# Patient Record
Sex: MALE | Race: ASIAN | NOT HISPANIC OR LATINO
[De-identification: names, ages, dates, MRNs, and addresses within clinical notes are randomized per-mention and may not be internally consistent; named-entity substitution may affect disease eponyms.]

---

## 2018-02-23 ENCOUNTER — APPOINTMENT (OUTPATIENT)
Dept: INTERNAL MEDICINE | Facility: CLINIC | Age: 38
End: 2018-02-23
Payer: COMMERCIAL

## 2018-02-23 VITALS
OXYGEN SATURATION: 99 % | BODY MASS INDEX: 23.11 KG/M2 | TEMPERATURE: 98.7 F | HEART RATE: 60 BPM | SYSTOLIC BLOOD PRESSURE: 104 MMHG | WEIGHT: 156 LBS | RESPIRATION RATE: 14 BRPM | DIASTOLIC BLOOD PRESSURE: 70 MMHG | HEIGHT: 69 IN

## 2018-02-23 LAB
BASOPHILS # BLD AUTO: 0.01 K/UL
BASOPHILS NFR BLD AUTO: 0.2 %
EOSINOPHIL # BLD AUTO: 0.03 K/UL
EOSINOPHIL NFR BLD AUTO: 0.5 %
HCT VFR BLD CALC: 42.4 %
HGB BLD-MCNC: 14.6 G/DL
IMM GRANULOCYTES NFR BLD AUTO: 0.2 %
LYMPHOCYTES # BLD AUTO: 2.3 K/UL
LYMPHOCYTES NFR BLD AUTO: 41.4 %
MAN DIFF?: NORMAL
MCHC RBC-ENTMCNC: 30 PG
MCHC RBC-ENTMCNC: 34.4 GM/DL
MCV RBC AUTO: 87.1 FL
MONOCYTES # BLD AUTO: 0.31 K/UL
MONOCYTES NFR BLD AUTO: 5.6 %
NEUTROPHILS # BLD AUTO: 2.9 K/UL
NEUTROPHILS NFR BLD AUTO: 52.1 %
PLATELET # BLD AUTO: 222 K/UL
RBC # BLD: 4.87 M/UL
RBC # FLD: 13.2 %
WBC # FLD AUTO: 5.56 K/UL

## 2018-02-23 PROCEDURE — 36415 COLL VENOUS BLD VENIPUNCTURE: CPT

## 2018-02-23 PROCEDURE — 99385 PREV VISIT NEW AGE 18-39: CPT | Mod: 25

## 2018-02-24 DIAGNOSIS — E55.9 VITAMIN D DEFICIENCY, UNSPECIFIED: ICD-10-CM

## 2018-02-24 LAB
25(OH)D3 SERPL-MCNC: 22.7 NG/ML
ALBUMIN SERPL ELPH-MCNC: 4.3 G/DL
ALP BLD-CCNC: 81 U/L
ALT SERPL-CCNC: 14 U/L
ANION GAP SERPL CALC-SCNC: 10 MMOL/L
AST SERPL-CCNC: 23 U/L
BILIRUB SERPL-MCNC: 0.4 MG/DL
BUN SERPL-MCNC: 15 MG/DL
CALCIUM SERPL-MCNC: 9.3 MG/DL
CHLORIDE SERPL-SCNC: 99 MMOL/L
CHOLEST SERPL-MCNC: 172 MG/DL
CHOLEST/HDLC SERPL: 3.2 RATIO
CK SERPL-CCNC: 129 U/L
CO2 SERPL-SCNC: 28 MMOL/L
CREAT SERPL-MCNC: 1.21 MG/DL
GLUCOSE SERPL-MCNC: 93 MG/DL
HBA1C MFR BLD HPLC: 5.4 %
HDLC SERPL-MCNC: 53 MG/DL
LDLC SERPL CALC-MCNC: 103 MG/DL
POTASSIUM SERPL-SCNC: 4.3 MMOL/L
PROT SERPL-MCNC: 7.7 G/DL
SODIUM SERPL-SCNC: 137 MMOL/L
TRIGL SERPL-MCNC: 80 MG/DL
TSH SERPL-ACNC: 1.17 UIU/ML

## 2018-02-24 RX ORDER — MULTIVIT-MIN/FOLIC/VIT K/LYCOP 400-300MCG
50 MCG TABLET ORAL
Qty: 100 | Refills: 2 | Status: ACTIVE | COMMUNITY

## 2018-11-26 ENCOUNTER — APPOINTMENT (OUTPATIENT)
Dept: UROLOGY | Facility: CLINIC | Age: 38
End: 2018-11-26
Payer: COMMERCIAL

## 2018-11-26 VITALS
WEIGHT: 155 LBS | TEMPERATURE: 98 F | BODY MASS INDEX: 24.33 KG/M2 | HEART RATE: 55 BPM | SYSTOLIC BLOOD PRESSURE: 123 MMHG | DIASTOLIC BLOOD PRESSURE: 74 MMHG | OXYGEN SATURATION: 97 % | HEIGHT: 67 IN

## 2018-11-26 DIAGNOSIS — R31.0 GROSS HEMATURIA: ICD-10-CM

## 2018-11-26 DIAGNOSIS — F52.4 PREMATURE EJACULATION: ICD-10-CM

## 2018-11-26 PROCEDURE — 99204 OFFICE O/P NEW MOD 45 MIN: CPT

## 2018-11-27 LAB
APPEARANCE: CLEAR
BACTERIA: NEGATIVE
BILIRUBIN URINE: NEGATIVE
BLOOD URINE: NEGATIVE
COLOR: ABNORMAL
CORE LAB FLUID CYTOLOGY: NORMAL
GLUCOSE QUALITATIVE U: NEGATIVE MG/DL
KETONES URINE: NEGATIVE
LEUKOCYTE ESTERASE URINE: NEGATIVE
MICROSCOPIC-UA: NORMAL
NITRITE URINE: NEGATIVE
PH URINE: 7
PROTEIN URINE: NEGATIVE MG/DL
RED BLOOD CELLS URINE: 2 /HPF
SPECIFIC GRAVITY URINE: 1.03
SQUAMOUS EPITHELIAL CELLS: 0 /HPF
UROBILINOGEN URINE: NEGATIVE MG/DL
WHITE BLOOD CELLS URINE: 1 /HPF

## 2018-11-28 LAB — BACTERIA UR CULT: NORMAL

## 2018-12-06 DIAGNOSIS — N52.9 MALE ERECTILE DYSFUNCTION, UNSPECIFIED: ICD-10-CM

## 2018-12-12 ENCOUNTER — OUTPATIENT (OUTPATIENT)
Dept: OUTPATIENT SERVICES | Facility: HOSPITAL | Age: 38
LOS: 1 days | End: 2018-12-12
Payer: COMMERCIAL

## 2018-12-12 ENCOUNTER — APPOINTMENT (OUTPATIENT)
Dept: CT IMAGING | Facility: CLINIC | Age: 38
End: 2018-12-12
Payer: COMMERCIAL

## 2018-12-12 DIAGNOSIS — R31.0 GROSS HEMATURIA: ICD-10-CM

## 2018-12-12 PROCEDURE — 74178 CT ABD&PLV WO CNTR FLWD CNTR: CPT

## 2018-12-12 PROCEDURE — 74178 CT ABD&PLV WO CNTR FLWD CNTR: CPT | Mod: 26

## 2021-02-28 ENCOUNTER — INPATIENT (INPATIENT)
Facility: HOSPITAL | Age: 41
LOS: 2 days | Discharge: HOME HEALTH | End: 2021-03-03
Attending: INTERNAL MEDICINE | Admitting: INTERNAL MEDICINE
Payer: COMMERCIAL

## 2021-02-28 VITALS
OXYGEN SATURATION: 97 % | HEART RATE: 138 BPM | SYSTOLIC BLOOD PRESSURE: 117 MMHG | DIASTOLIC BLOOD PRESSURE: 76 MMHG | RESPIRATION RATE: 18 BRPM | TEMPERATURE: 99 F

## 2021-02-28 DIAGNOSIS — I26.99 OTHER PULMONARY EMBOLISM WITHOUT ACUTE COR PULMONALE: ICD-10-CM

## 2021-02-28 DIAGNOSIS — I26.94 MULTIPLE SUBSEGMENTAL PULMONARY EMBOLI WITHOUT ACUTE COR PULMONALE: ICD-10-CM

## 2021-02-28 DIAGNOSIS — U07.1 COVID-19: ICD-10-CM

## 2021-02-28 DIAGNOSIS — Z29.9 ENCOUNTER FOR PROPHYLACTIC MEASURES, UNSPECIFIED: ICD-10-CM

## 2021-02-28 LAB
ALBUMIN SERPL ELPH-MCNC: 4.3 G/DL — SIGNIFICANT CHANGE UP (ref 3.3–5)
ALP SERPL-CCNC: 105 U/L — SIGNIFICANT CHANGE UP (ref 40–120)
ALT FLD-CCNC: 25 U/L — SIGNIFICANT CHANGE UP (ref 4–41)
ANION GAP SERPL CALC-SCNC: 10 MMOL/L — SIGNIFICANT CHANGE UP (ref 7–14)
APPEARANCE UR: CLEAR — SIGNIFICANT CHANGE UP
APTT BLD: 30.6 SEC — SIGNIFICANT CHANGE UP (ref 27–36.3)
AST SERPL-CCNC: 15 U/L — SIGNIFICANT CHANGE UP (ref 4–40)
BACTERIA # UR AUTO: NEGATIVE — SIGNIFICANT CHANGE UP
BASOPHILS # BLD AUTO: 0.01 K/UL — SIGNIFICANT CHANGE UP (ref 0–0.2)
BASOPHILS NFR BLD AUTO: 0.1 % — SIGNIFICANT CHANGE UP (ref 0–2)
BILIRUB SERPL-MCNC: 0.8 MG/DL — SIGNIFICANT CHANGE UP (ref 0.2–1.2)
BILIRUB UR-MCNC: NEGATIVE — SIGNIFICANT CHANGE UP
BUN SERPL-MCNC: 14 MG/DL — SIGNIFICANT CHANGE UP (ref 7–23)
CALCIUM SERPL-MCNC: 9.3 MG/DL — SIGNIFICANT CHANGE UP (ref 8.4–10.5)
CHLORIDE SERPL-SCNC: 98 MMOL/L — SIGNIFICANT CHANGE UP (ref 98–107)
CO2 SERPL-SCNC: 27 MMOL/L — SIGNIFICANT CHANGE UP (ref 22–31)
COLOR SPEC: YELLOW — SIGNIFICANT CHANGE UP
CREAT SERPL-MCNC: 1.02 MG/DL — SIGNIFICANT CHANGE UP (ref 0.5–1.3)
D DIMER BLD IA.RAPID-MCNC: 633 NG/ML DDU — HIGH
DIFF PNL FLD: NEGATIVE — SIGNIFICANT CHANGE UP
EOSINOPHIL # BLD AUTO: 0.01 K/UL — SIGNIFICANT CHANGE UP (ref 0–0.5)
EOSINOPHIL NFR BLD AUTO: 0.1 % — SIGNIFICANT CHANGE UP (ref 0–6)
EPI CELLS # UR: 0 /HPF — SIGNIFICANT CHANGE UP (ref 0–5)
GLUCOSE SERPL-MCNC: 129 MG/DL — HIGH (ref 70–99)
GLUCOSE UR QL: NEGATIVE — SIGNIFICANT CHANGE UP
HCT VFR BLD CALC: 42.7 % — SIGNIFICANT CHANGE UP (ref 39–50)
HGB BLD-MCNC: 14.4 G/DL — SIGNIFICANT CHANGE UP (ref 13–17)
IANC: 7.88 K/UL — SIGNIFICANT CHANGE UP (ref 1.5–8.5)
IMM GRANULOCYTES NFR BLD AUTO: 0.9 % — SIGNIFICANT CHANGE UP (ref 0–1.5)
INR BLD: 1.28 RATIO — HIGH (ref 0.88–1.16)
KETONES UR-MCNC: NEGATIVE — SIGNIFICANT CHANGE UP
LEUKOCYTE ESTERASE UR-ACNC: NEGATIVE — SIGNIFICANT CHANGE UP
LYMPHOCYTES # BLD AUTO: 1.87 K/UL — SIGNIFICANT CHANGE UP (ref 1–3.3)
LYMPHOCYTES # BLD AUTO: 17.2 % — SIGNIFICANT CHANGE UP (ref 13–44)
MCHC RBC-ENTMCNC: 29.3 PG — SIGNIFICANT CHANGE UP (ref 27–34)
MCHC RBC-ENTMCNC: 33.7 GM/DL — SIGNIFICANT CHANGE UP (ref 32–36)
MCV RBC AUTO: 87 FL — SIGNIFICANT CHANGE UP (ref 80–100)
MONOCYTES # BLD AUTO: 1.01 K/UL — HIGH (ref 0–0.9)
MONOCYTES NFR BLD AUTO: 9.3 % — SIGNIFICANT CHANGE UP (ref 2–14)
NEUTROPHILS # BLD AUTO: 7.88 K/UL — HIGH (ref 1.8–7.4)
NEUTROPHILS NFR BLD AUTO: 72.4 % — SIGNIFICANT CHANGE UP (ref 43–77)
NITRITE UR-MCNC: NEGATIVE — SIGNIFICANT CHANGE UP
NRBC # BLD: 0 /100 WBCS — SIGNIFICANT CHANGE UP
NRBC # FLD: 0 K/UL — SIGNIFICANT CHANGE UP
PH UR: 6.5 — SIGNIFICANT CHANGE UP (ref 5–8)
PLATELET # BLD AUTO: 382 K/UL — SIGNIFICANT CHANGE UP (ref 150–400)
POTASSIUM SERPL-MCNC: 4 MMOL/L — SIGNIFICANT CHANGE UP (ref 3.5–5.3)
POTASSIUM SERPL-SCNC: 4 MMOL/L — SIGNIFICANT CHANGE UP (ref 3.5–5.3)
PROT SERPL-MCNC: 8.4 G/DL — HIGH (ref 6–8.3)
PROT UR-MCNC: ABNORMAL
PROTHROM AB SERPL-ACNC: 14.4 SEC — HIGH (ref 10.6–13.6)
RBC # BLD: 4.91 M/UL — SIGNIFICANT CHANGE UP (ref 4.2–5.8)
RBC # FLD: 12 % — SIGNIFICANT CHANGE UP (ref 10.3–14.5)
RBC CASTS # UR COMP ASSIST: 1 /HPF — SIGNIFICANT CHANGE UP (ref 0–4)
SARS-COV-2 RNA SPEC QL NAA+PROBE: DETECTED
SODIUM SERPL-SCNC: 135 MMOL/L — SIGNIFICANT CHANGE UP (ref 135–145)
SP GR SPEC: 1.03 — HIGH (ref 1.01–1.02)
UROBILINOGEN FLD QL: ABNORMAL
WBC # BLD: 10.88 K/UL — HIGH (ref 3.8–10.5)
WBC # FLD AUTO: 10.88 K/UL — HIGH (ref 3.8–10.5)
WBC UR QL: 1 /HPF — SIGNIFICANT CHANGE UP (ref 0–5)

## 2021-02-28 PROCEDURE — 99285 EMERGENCY DEPT VISIT HI MDM: CPT | Mod: 25

## 2021-02-28 PROCEDURE — 71045 X-RAY EXAM CHEST 1 VIEW: CPT | Mod: 26

## 2021-02-28 PROCEDURE — 71275 CT ANGIOGRAPHY CHEST: CPT | Mod: 26

## 2021-02-28 PROCEDURE — 93010 ELECTROCARDIOGRAM REPORT: CPT | Mod: 59

## 2021-02-28 PROCEDURE — 99223 1ST HOSP IP/OBS HIGH 75: CPT | Mod: GC

## 2021-02-28 RX ORDER — KETOROLAC TROMETHAMINE 30 MG/ML
15 SYRINGE (ML) INJECTION ONCE
Refills: 0 | Status: DISCONTINUED | OUTPATIENT
Start: 2021-02-28 | End: 2021-02-28

## 2021-02-28 RX ORDER — ACETAMINOPHEN 500 MG
650 TABLET ORAL ONCE
Refills: 0 | Status: COMPLETED | OUTPATIENT
Start: 2021-02-28 | End: 2021-02-28

## 2021-02-28 RX ORDER — ENOXAPARIN SODIUM 100 MG/ML
73 INJECTION SUBCUTANEOUS
Refills: 0 | Status: DISCONTINUED | OUTPATIENT
Start: 2021-02-28 | End: 2021-02-28

## 2021-02-28 RX ORDER — INFLUENZA VIRUS VACCINE 15; 15; 15; 15 UG/.5ML; UG/.5ML; UG/.5ML; UG/.5ML
0.5 SUSPENSION INTRAMUSCULAR ONCE
Refills: 0 | Status: DISCONTINUED | OUTPATIENT
Start: 2021-02-28 | End: 2021-03-03

## 2021-02-28 RX ORDER — SODIUM CHLORIDE 9 MG/ML
1000 INJECTION INTRAMUSCULAR; INTRAVENOUS; SUBCUTANEOUS ONCE
Refills: 0 | Status: COMPLETED | OUTPATIENT
Start: 2021-02-28 | End: 2021-02-28

## 2021-02-28 RX ORDER — LIDOCAINE 4 G/100G
1 CREAM TOPICAL ONCE
Refills: 0 | Status: COMPLETED | OUTPATIENT
Start: 2021-02-28 | End: 2021-02-28

## 2021-02-28 RX ORDER — ENOXAPARIN SODIUM 100 MG/ML
70 INJECTION SUBCUTANEOUS
Refills: 0 | Status: DISCONTINUED | OUTPATIENT
Start: 2021-02-28 | End: 2021-03-02

## 2021-02-28 RX ADMIN — ENOXAPARIN SODIUM 70 MILLIGRAM(S): 100 INJECTION SUBCUTANEOUS at 18:51

## 2021-02-28 RX ADMIN — Medication 15 MILLIGRAM(S): at 15:51

## 2021-02-28 RX ADMIN — SODIUM CHLORIDE 1000 MILLILITER(S): 9 INJECTION INTRAMUSCULAR; INTRAVENOUS; SUBCUTANEOUS at 15:52

## 2021-02-28 RX ADMIN — LIDOCAINE 1 PATCH: 4 CREAM TOPICAL at 15:51

## 2021-02-28 RX ADMIN — Medication 650 MILLIGRAM(S): at 15:51

## 2021-02-28 RX ADMIN — LIDOCAINE 1 PATCH: 4 CREAM TOPICAL at 19:18

## 2021-02-28 NOTE — H&P ADULT - PROBLEM SELECTOR PLAN 2
Prior COVID-19 infection still PCR+  -No role for remdesivir or steroids  -Lovenox as above Provoked pulmonary embolism with CT equivocal for Rt heart strain  -Concern for Rt heart strain clinically low given troponin 6 , ProBNP 251 and no exertional dyspnea  -F/u TTE  -Lovenox 1 mg/kg BID  -Transition to Eliquis or Xarelto depending on patient's insurance

## 2021-02-28 NOTE — PROVIDER CONTACT NOTE (OTHER) - SITUATION
Patient going to [id number], admitted to medicine. Confirmed by provider that patient will not need tele.

## 2021-02-28 NOTE — H&P ADULT - ASSESSMENT
40 M with recent COVID-19 infection now presenting with submassive PE without troponinemia and equivocal CT findings of R heart strain. 40 M with recent COVID-19 infection now presenting with back pain found to have PE without troponinemia and equivocal CT findings of R heart strain. 40 M with recent COVID-19 infection now presenting with back pain a/w SIRS due to PE without troponinemia and equivocal CT findings of Rt heart strain.

## 2021-02-28 NOTE — ED PROVIDER NOTE - OBJECTIVE STATEMENT
41 yo M with no significant PMHx except COVID19+ on 2/11/21, presents for 3 days of R back pain. It is constant, worse w/ movement, and pleuritic in nature. Pt also having decreased appetite and PO intake due to loss of taste/smell. pt also has been coughing, which worsened the pain. Denies fever, sob, abd pain, cp, nausea, vomiting, diarrhea, myalgias 39 yo M with no significant PMHx except COVID19+ on 2/11/21, presents for 3 days of R back pain. It is constant, worse w/ movement, and pleuritic in nature. Pt also having decreased appetite and PO intake due to loss of taste/smell. pt also has been coughing, which worsened the pain. Denies fever, sob, abd pain, cp, nausea, vomiting, diarrhea, myalgias    Has not seen PMD in about 2 years

## 2021-02-28 NOTE — H&P ADULT - PROBLEM SELECTOR PLAN 3
Lovenox 1 mg/kg as above  Regular diet  Normal activity Due to PE; Likely pleuritic etiology;   -Pain control

## 2021-02-28 NOTE — H&P ADULT - NSHPLABSRESULTS_GEN_ALL_CORE
LABS:                          14.4   10.88 )-----------( 382      ( 2021 15:40 )             42.7           135  |  98  |  14  ----------------------------<  129<H>  4.0   |  27  |  1.02    Ca    9.3      2021 15:40    TPro  8.4<H>  /  Alb  4.3  /  TBili  0.8  /  DBili  x   /  AST  15  /  ALT  25  /  AlkPhos  105         LIVER FUNCTIONS - ( 2021 15:40 )  Alb: 4.3 g/dL / Pro: 8.4 g/dL / ALK PHOS: 105 U/L / ALT: 25 U/L / AST: 15 U/L / GGT: x                    Urinalysis Basic - ( 2021 17:37 )    Color: Yellow / Appearance: Clear / S.033 / pH: x  Gluc: x / Ketone: Negative  / Bili: Negative / Urobili: 3 mg/dL   Blood: x / Protein: 30 mg/dL / Nitrite: Negative   Leuk Esterase: Negative / RBC: 1 /HPF / WBC 1 /HPF   Sq Epi: x / Non Sq Epi: 0 /HPF / Bacteria: Negative        PT/INR - ( 2021 15:40 )   PT: 14.4 sec;   INR: 1.28 ratio         PTT - ( 2021 15:40 )  PTT:30.6 sec    Lactate Trend            CAPILLARY BLOOD GLUCOSE            EKG-   no ST or TWI      RADIOLOGY & ADDITIONAL TESTS: Reviewed  CTA - Right-sided pulmonary emboli, question right heart strain. Echo recommended for complete evaluation. Bilateral subtle patchy lung opacities may be infectious or inflammatory. EKG, , sinus tach 123bpm,  no acute ST or TWI changes - my reading  CXR: very fine scattered opacifications more at b/l bases c/w h/o Covid-19, no pleural effusions - my reading                           14.4   10.88 )-----------( 382      ( 2021 15:40 )             42.7           135  |  98  |  14  ----------------------------<  129<H>  4.0   |  27  |  1.02    Ca    9.3      2021 15:40    TPro  8.4<H>  /  Alb  4.3  /  TBili  0.8  /  DBili  x   /  AST  15  /  ALT  25  /  AlkPhos  105         LIVER FUNCTIONS - ( 2021 15:40 )  Alb: 4.3 g/dL / Pro: 8.4 g/dL / ALK PHOS: 105 U/L / ALT: 25 U/L / AST: 15 U/L / GGT: x                Urinalysis Basic - ( 2021 17:37 )    Color: Yellow / Appearance: Clear / S.033 / pH: x  Gluc: x / Ketone: Negative  / Bili: Negative / Urobili: 3 mg/dL   Blood: x / Protein: 30 mg/dL / Nitrite: Negative   Leuk Esterase: Negative / RBC: 1 /HPF / WBC 1 /HPF   Sq Epi: x / Non Sq Epi: 0 /HPF / Bacteria: Negative    PT/INR - ( 2021 15:40 )   PT: 14.4 sec;   INR: 1.28 ratio         PTT - ( 2021 15:40 )  PTT:30.6 sec    Lactate Trend        RADIOLOGY & ADDITIONAL TESTS: Reviewed  CTA - Right-sided pulmonary emboli, question right heart strain. Echo recommended for complete evaluation. Bilateral subtle patchy lung opacities may be infectious or inflammatory.

## 2021-02-28 NOTE — ED PROVIDER NOTE - CLINICAL SUMMARY MEDICAL DECISION MAKING FREE TEXT BOX
Oswaldo Romero MD. pt with covid19+ on 2/11/21, presents for 3 days of posterior rib/back pain, pleuritic in nature. has been coughing but denies sob. denies radiation of pain. karen hematuria. comforable appearing. tachy. could be msk vs ?stone (will check for hematuria) cannot perc out and thus will dimer, labs, and if dimer positive, pursue CTA.

## 2021-02-28 NOTE — H&P ADULT - HISTORY OF PRESENT ILLNESS
Mr. Freitas is a 41 yo M with recent COVID-19 infection presenting with right back pain.    He was in his usual state of health until 2/11 when he tested positive for COVID-19. He had begun noticing dry cough intermittently productive of yellow/green sputum, fevers, chills, one episode of rigors, anosmia, loss of taste. He self-quarantined for 14 days over which time his fevers, chills, and cough resolved but his anosmia and loss of taste persisted. A repeat COVID-19 test was negative outpatient. Then, on Friday night he began noticing 7-8/10 right back pain that is worse with movements, breathing, and cough, which continued to progress to 10/10 pain on Saturday. He had no associated shortness of breath or cough nor dyspnea on exertion. When his right back pain persisted he presented to the emergency department for evaluation.    In the ED, T 99.1-99.8,  > 1 L NS > 93, -123/75-86. In addition to 1  L NS, he received ketorolac 15 mg IV which controlled his pain from 10/10 to 5/10. 39 yo Male with recent COVID-19 infection presenting with right back pain. He was in his usual state of health until 2/11 when he tested positive for COVID-19. He had begun noticing dry cough intermittently productive of yellow/green sputum, fevers, chills, one episode of rigors, anosmia, loss of taste. He self-quarantined for 14 days over which time his fevers, chills, and cough resolved but his anosmia and loss of taste persisted. A repeat COVID-19 test was negative outpatient. Then, on Friday night he began noticing 7-8/10 right back pain that is worse with movements, breathing, and cough, which continued to progress to 10/10 pain on Saturday. He had no associated shortness of breath or cough nor dyspnea on exertion. When his right back pain persisted he presented to the emergency department for evaluation.    ED course:  T 99.1-99.8,  > 1 L NS > 93, -123/75-86. In addition to 1  L NS, he received ketorolac 15 mg IV which controlled his pain from 10/10 to 5/10.

## 2021-02-28 NOTE — ED PROVIDER NOTE - ATTENDING CONTRIBUTION TO CARE
39yo M otherwise healthy covid + x 1 week pw mid right sided back pain. pain worse with movement, cough and inspiration. pain is constant. has not taken anything for pain. no urinary sx, no nausea, vomiting, diarrhea, + decreased PO. no chest pain or sob  on exam + tender in right flank area. abd nontender  tachycardic, lungs clear  likely msk, however given pleuritic component, tachycardia and covid positive will check dimer  will alos check UA to ro pyelo butno urinary sx

## 2021-02-28 NOTE — ED PROVIDER NOTE - NS ED ROS FT
Constitutional: no fevers; no chills  HEENT: no visual changes, no sore throat, no rhinorrhea  CV: cp; no palpitations  Resp: no sob; cough  GI: no abd pain, no nausea, no vomiting, no diarrhea, no constipation  : no dysuria, no hematuria  MSK: no myalgais; no arthralgias  skin: no rashes  neuro: no HA, no numbness; no weakness, no tingling  ROS statement: all other ROS negative except as per HPI

## 2021-02-28 NOTE — ED ADULT NURSE NOTE - OBJECTIVE STATEMENT
Break covering RN: 41 y/o M received to room 21 c/o R sided flank pain. Pt a&ox4 and ambulatory. Pt states for 5 days, Break covering RN: 41 y/o M received to room 21 c/o R sided flank pain. Pt a&ox4 and ambulatory. Pt states for 5 days, hes had R sided flank pain. Pt states he was covid (+) 11 days ago and has since had a cough. Pt endorses fevers at home and sob. Pt respirations even and unlabored. pt abdomen soft nontender nondistended. NO CVA tenderness noted. Pt denies n/v/d, urinary frequency urinary urgency or hematuria. Vital signs as noted, call bell in reach, comfort measures provided, give report to primary RN.

## 2021-02-28 NOTE — H&P ADULT - PROBLEM SELECTOR PLAN 1
Provoked submassive pulmonary embolism with CT equivocal for R heart strain  -Concern for R heart strain clinically low given troponin 6 and no exertional dyspnea  -F/u TTE  -Lovenox 1 mg/kg BID  -Consider transition to eliquis or xarelto depending on patient's insurance Provoked pulmonary embolism with CT equivocal for R heart strain  -Concern for R heart strain clinically low given troponin 6 and no exertional dyspnea  -F/u TTE  -Lovenox 1 mg/kg BID  -Consider transition to eliquis or xarelto depending on patient's insurance Tachycardia, HR in 130-90s; RR=30s-20s; Due to acute post covid-19 PE; WBC=11K  -Hold Abx  -VS q4h  -Plan as below

## 2021-02-28 NOTE — ED PROVIDER NOTE - PROGRESS NOTE DETAILS
Oswaldo Romero MD. received call from rads regarding R PE with mild RH strain. pt not hypoxic. tachycardia improved. nto on supplemental O2. explained results to patient and need to stay for echo and start AC. spoke w/ hospitalist and given pt has good renal fxn, will opt for therapeutic lovenox.

## 2021-02-28 NOTE — H&P ADULT - NSHPSOCIALHISTORY_GEN_ALL_CORE
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

## 2021-02-28 NOTE — ED PROVIDER NOTE - PHYSICAL EXAMINATION
PHYSICAL EXAM:  GENERAL: non-toxic appearing; in no respiratory distress  HEAD Atraumatic, Normocephalic  NECK: No JVD; FROM  EYES: PERRL, EOMs intact b/l w/out deficits; normal conjunctiva  CHEST/LUNG: CTAB no wheezes/rhonchi/rales  HEART: RRR no murmur/gallops/rubs  ABDOMEN: +BS, soft, NT, ND  EXTREMITIES: No LE edema, +2 radial pulses b/l, +2 DP/PT pulses b/l  MUSCULOSKELETAL: FROM of all 4 extremities; point tender to Right posterior rib  NERVOUS SYSTEM:  A&Ox3, No motor deficits or sensory deficits; CNII-XII intact; no focal neurologic deficits  SKIN:  No new rashes PHYSICAL EXAM:  GENERAL: non-toxic appearing; in no respiratory distress  HEAD Atraumatic, Normocephalic  NECK: No JVD; FROM  EYES: PERRL, EOMs intact b/l w/out deficits; normal conjunctiva  CHEST/LUNG: CTAB no wheezes/rhonchi/rales  HEART: RRR no murmur/gallops/rubs  ABDOMEN: +BS, soft, NT, ND; no CVAT  EXTREMITIES: No LE edema, +2 radial pulses b/l, +2 DP/PT pulses b/l  MUSCULOSKELETAL: FROM of all 4 extremities; point tender to Right posterior rib  NERVOUS SYSTEM:  A&Ox3, No motor deficits or sensory deficits; CNII-XII intact; no focal neurologic deficits  SKIN:  No new rashes

## 2021-02-28 NOTE — H&P ADULT - NSHPREVIEWOFSYSTEMS_GEN_ALL_CORE
REVIEW OF SYSTEMS:    CONSTITUTIONAL: No weakness, fevers or chills  EYES/ENT: No visual changes;  No vertigo or throat pain   NECK: No pain or stiffness  RESPIRATORY: No cough, wheezing, hemoptysis; No shortness of breath. +Pleuritic chest pain  CARDIOVASCULAR: No chest pain or palpitations  GASTROINTESTINAL: No abdominal pain; nausea, vomiting;  diarrhea or constipation. No hematemesis, melena or hematochezia.  GENITOURINARY: No dysuria, frequency or hematuria  NEUROLOGICAL: No numbness or weakness  SKIN: No itching, burning, rashes, or lesions   All other review of systems is negative unless indicated above. REVIEW OF SYSTEMS:    CONSTITUTIONAL: No weakness, fevers or chills  EYES/ENT: No visual changes;  No vertigo or throat pain   NECK: No pain or stiffness  RESPIRATORY: No cough, wheezing, hemoptysis;  no shortness of breath. +Pleuritic chest pain  CARDIOVASCULAR: No chest pain or palpitations  GASTROINTESTINAL: No abdominal pain; nausea, vomiting;  diarrhea or constipation. No hematemesis, melena or hematochezia.  GENITOURINARY: No dysuria, frequency or hematuria  NEUROLOGICAL: No numbness or weakness  SKIN: No itching, burning, rashes, or lesions   All other review of systems is negative unless indicated above.

## 2021-02-28 NOTE — H&P ADULT - NSHPPHYSICALEXAM_GEN_ALL_CORE
PHYSICAL EXAM:    Vital Signs Last 24 Hrs  T(C): 37.3 (28 Feb 2021 17:47), Max: 37.7 (28 Feb 2021 15:34)  T(F): 99.1 (28 Feb 2021 17:47), Max: 99.8 (28 Feb 2021 15:34)  HR: 93 (28 Feb 2021 17:47) (93 - 138)  BP: 123/75 (28 Feb 2021 17:47) (117/76 - 124/85)  BP(mean): --  RR: 28 (28 Feb 2021 17:47) (18 - 32)  SpO2: 95% (28 Feb 2021 17:47) (95% - 98%)    General: No acute distress  HEENT: NCAT.  PERRL.  EOMI.  No scleral icterus or injection.  Moist MM.  No oropharyngeal exudates.    Neck: Supple.  Full ROM.  No JVD.  No thyromegaly. No lymphadenopathy.   Heart: RRR.  Normal S1 and S2, increase s2 split. No murmurs, rubs, or gallops.  Lungs: CTAB. No wheezes, crackles, or rhonchi.    Abdomen: BS+, soft, NT/ND.  No organomegaly.  Skin: Warm and dry.  No rashes.  Extremities: No edema, clubbing, or cyanosis.  2+ peripheral pulses b/l.  Musculoskeletal: No deformities.  No spinal or paraspinal tenderness.  Neuro: A&Ox3.  MAEx4.  Tactile sensation intact in UE and LE b/l.

## 2021-03-01 DIAGNOSIS — M54.6 PAIN IN THORACIC SPINE: ICD-10-CM

## 2021-03-01 DIAGNOSIS — R65.10 SYSTEMIC INFLAMMATORY RESPONSE SYNDROME (SIRS) OF NON-INFECTIOUS ORIGIN WITHOUT ACUTE ORGAN DYSFUNCTION: ICD-10-CM

## 2021-03-01 PROCEDURE — 99223 1ST HOSP IP/OBS HIGH 75: CPT

## 2021-03-01 PROCEDURE — 93970 EXTREMITY STUDY: CPT | Mod: 26

## 2021-03-01 RX ADMIN — ENOXAPARIN SODIUM 70 MILLIGRAM(S): 100 INJECTION SUBCUTANEOUS at 06:04

## 2021-03-01 RX ADMIN — ENOXAPARIN SODIUM 70 MILLIGRAM(S): 100 INJECTION SUBCUTANEOUS at 17:02

## 2021-03-01 NOTE — CONSULT NOTE ADULT - ATTENDING COMMENTS
Patient was seen and examined by me on 03/01/2021,interim events noted,labs and radiology studies reviewed.  James Diaz MD,[facility].  [address] [place] [address].  [id number]. [phone]

## 2021-03-01 NOTE — CONSULT NOTE ADULT - ASSESSMENT
· Assessment      40 M with recent COVID-19 infection now presenting with back pain a/w SIRS due to PE without troponinemia and equivocal CT findings of Rt heart strain.    Problem/Plan - 1:  ·  Problem: SIRS (systemic inflammatory response syndrome).  Plan: Tachycardia, HR in 130-90s; RR=30s-20s; Due to acute post covid-19 PE; WBC=11K  -Hold Abx  -VS q4h  -Plan as below.     Problem/Plan - 2:  ·  Problem: Pulmonary embolism.  Plan: Provoked pulmonary embolism with CT equivocal for Rt heart strain  -Concern for Rt heart strain clinically low given troponin 6 , ProBNP 251 and no exertional dyspnea  -F/u TTE  -Lovenox 1 mg/kg BID  -Transition to Eliquis    Problem/Plan - 3:  ·  Problem: Acute thoracic back pain, unspecified back pain laterality.  Plan: Due to PE; Likely pleuritic etiology;   -Pain control.     Problem/Plan - 4:  ·  Problem: COVID-19.  Plan: Prior COVID-19 infection still PCR+  -No role for remdesivir or steroids  -Lovenox as above.     Problem/Plan - 5:  ·  Problem: Preventive measure.  Plan: Lovenox 1 mg/kg as above  Regular diet  Normal activity.

## 2021-03-01 NOTE — CONSULT NOTE ADULT - SUBJECTIVE AND OBJECTIVE BOX
PULMONARY CONSULT NOTE      ISAMAR DELAROSA  MRN-[id number]    Patient is a 40y old  Male who presents with a chief complaint of Back pain (01 Mar 2021 13:11)      HISTORY OF PRESENT ILLNESS:  41 yo never smoker, recent covid 19 infectio mid feb- never admitted,reportedly swab negative on 2/26, (took OTC meds for COVID/supportive care) admitted with 2 days of right sided flank pain  Found to have PE.  denies history of similar events. no immobility. no history of DVT/PE in family. no autoimmune history. did not take NOAC or aspirin with covid  no LE swelling. lingering cough- sometimes feels difficulty catching his breath when talking  no hemoptysis. no unintentional weight loss. no night sweats    on room air - pain resolved      Allergies    No Known Allergies    Intolerances        PAST MEDICAL & SURGICAL HISTORY:  Pneumonia due to COVID-19 virus    No significant past surgical history            FAMILY HISTORY:  FH: HTN (hypertension)      Prescriptions:      SOCIAL HISTORY  Smoking History: denies    REVIEW OF SYSTEMS:    CONSTITUTIONAL:  No fevers, chills, sweats    HEENT:  Eyes:  No diplopia or blurred vision. ENT:  No earache, sore throat or runny nose.    CARDIOVASCULAR:  No pressure, squeezing, tightness, or heaviness about the chest; no palpitations.    RESPIRATORY:  Per HPI    GASTROINTESTINAL:  No abdominal pain, nausea, vomiting or diarrhea.    GENITOURINARY:  No dysuria, frequency or urgency.    NEUROLOGIC:  No paresthesias, fasciculations, seizures or weakness.    PSYCHIATRIC:  No disorder of thought or mood.    Vital Signs Last 24 Hrs  T(C): 37.2 (01 Mar 2021 06:03), Max: 37.7 (28 Feb 2021 15:34)  T(F): 98.9 (01 Mar 2021 06:03), Max: 99.8 (28 Feb 2021 15:34)  HR: 90 (01 Mar 2021 06:03) (85 - 116)  BP: 111/88 (01 Mar 2021 06:03) (111/88 - 124/85)  BP(mean): --  RR: 18 (01 Mar 2021 06:03) (18 - 32)  SpO2: 97% (01 Mar 2021 06:03) (94% - 98%)    PHYSICAL EXAMINATION:    GENERAL: The patient is a well-developed, well-nourished male in no apparent distress.     HEENT: Head is normocephalic and atraumatic.      LUNGS:  Respirations unlabored    HEART: Regular rate         NEUROLOGIC: Grossly intact      MEDICATIONS  (STANDING):  enoxaparin Injectable 70 milliGRAM(s) SubCutaneous two times a day  influenza   Vaccine 0.5 milliLiter(s) IntraMuscular once      MEDICATIONS  (PRN):        LABS:   CBC Full  -  ( 28 Feb 2021 15:40 )  WBC Count : 10.88 K/uL  RBC Count : 4.91 M/uL  Hemoglobin : 14.4 g/dL  Hematocrit : 42.7 %  Platelet Count - Automated : 382 K/uL  Mean Cell Volume : 87.0 fL  Mean Cell Hemoglobin : 29.3 pg  Mean Cell Hemoglobin Concentration : 33.7 gm/dL  Auto Neutrophil # : 7.88 K/uL  Auto Lymphocyte # : 1.87 K/uL  Auto Monocyte # : 1.01 K/uL  Auto Eosinophil # : 0.01 K/uL  Auto Basophil # : 0.01 K/uL  Auto Neutrophil % : 72.4 %  Auto Lymphocyte % : 17.2 %  Auto Monocyte % : 9.3 %  Auto Eosinophil % : 0.1 %  Auto Basophil % : 0.1 %    PT/INR - ( 28 Feb 2021 15:40 )   PT: 14.4 sec;   INR: 1.28 ratio         PTT - ( 28 Feb 2021 15:40 )  PTT:30.6 sec  02-28    135  |  98  |  14  ----------------------------<  129<H>  4.0   |  27  |  1.02    Ca    9.3      28 Feb 2021 15:40    TPro  8.4<H>  /  Alb  4.3  /  TBili  0.8  /  DBili  x   /  AST  15  /  ALT  25  /  AlkPhos  105  02-28       RADIOLOGY & ADDITIONAL STUDIES:  ra< from: CT Angio Chest w/ IV Cont (02.28.21 @ 17:06) >    EXAM:  CT ANGIO CHEST (W)AW IC        PROCEDURE DATE:  Feb 28 2021         INTERPRETATION:  EXAMINATION: CT ANGIO CHEST WITHOUT AND OR WITH IV CONTRAST    CLINICAL INDICATION: Dyspnea    TECHNIQUE: CTA of the chest was performed for evaluation of pulmonary embolism after administration of 90ml of Omnipaque-350, 10ml discarded.  MIP images were reconstructed.    COMPARISON: None.    FINDINGS:    PULMONARY ARTERIES: Right upper and lower lobe segmental and subsegmental pulmonary emboli.    AIRWAYS AND LUNGS: The central tracheobronchial tree is patent.  Subtle patchy bilateral lung opacities. Bubbly consolidation right lower lobe likely represents pulmonary infarct.    MEDIASTINUM AND PLEURA: There are no enlarged mediastinal, hilar or axillary lymph nodes. The visualized portion of the thyroid gland is unremarkable. There is a small right pleural effusion. There is no pneumothorax.    HEART AND VESSELS: The heart is normal in size. Slightly enlarged right heart.  There are no atherosclerotic calcifications of the aorta.  There is no pericardial effusion.    UPPER ABDOMEN: Images of the upper abdomen demonstrate no abnormality.    BONES AND SOFT TISSUES: The bones are unremarkable.  The soft tissues are unremarkable.    TUBES/LINES: None.    IMPRESSION:  Right-sided pulmonary emboli, question right heart strain. Echo recommended for complete evaluation.    Bilateral subtle patchy lung opacities may be infectious or inflammatory.    These findings were discussed with Dr. SOLANO  at 2/28/2021 5:50 PM by Dr. Sonia lennon back confirmation.              BRICE ADAMS MD; Attending Radiologist  This document has been electronically signed. Feb 28 2021  6:01PM    < end of copied text >  < from: Xray Chest 1 View AP/PA (02.28.21 @ 15:44) >    EXAM:  XR CHEST AP OR PA 1V        PROCEDURE DATE:  Feb 28 2021         INTERPRETATION:  INDICATION: Cough. Right pleuritic back pain.    TECHNIQUE: Single AP view of the chest.    COMPARISON: None.    FINDINGS: The cardiac silhouette is normal in size. The lung volumes are slightly low. There are no focal consolidations or pleural effusions. The hilar and mediastinal structures appear unremarkable.    IMPRESSION: Slightly low lung volumes with clear lungs.              COY HERNANDEZ MD; Attending Radiologist  This document has been electronically signed. Feb 28 2021  3:52PM    < end of copied text >    ECHO:  none    ASSESSMENT:  41 yo with COVID in feb now with PE    PLAN:  agree with TTE  check US dopplers  likely PE due to COVID 19  consider ct a/p to r/o malignancy  continue lovenox for now  if RV strain on TTE- Interventional cardio (although less likely given normal probnp & troponin)  heme consult       Thank you for allowing me to participate in the care of this patient.  Please feel free to call me for any questions/concerns.      Georgia Jonas, DO  [facility]  [phone]

## 2021-03-01 NOTE — PROGRESS NOTE ADULT - SUBJECTIVE AND OBJECTIVE BOX
SUBJECTIVE / OVERNIGHT EVENTS: pt denies chest pain, shortness of breath       MEDICATIONS  (STANDING):  enoxaparin Injectable 70 milliGRAM(s) SubCutaneous two times a day  influenza   Vaccine 0.5 milliLiter(s) IntraMuscular once    MEDICATIONS  (PRN):    Vital Signs Last 24 Hrs  T(C): 36.8 (01 Mar 2021 22:02), Max: 37.2 (01 Mar 2021 06:03)  T(F): 98.2 (01 Mar 2021 22:02), Max: 98.9 (01 Mar 2021 06:03)  HR: 88 (01 Mar 2021 22:02) (85 - 92)  BP: 122/68 (01 Mar 2021 22:02) (111/88 - 122/72)  BP(mean): --  RR: 18 (01 Mar 2021 22:02) (18 - 18)  SpO2: 99% (01 Mar 2021 22:02) (96% - 99%)    CAPILLARY BLOOD GLUCOSE        I&O's Summary      Constitutional: No fever, fatigue  Skin: No rash.  Eyes: No recent vision problems or eye pain.  ENT: No congestion, ear pain, or sore throat.  Cardiovascular: No chest pain or palpation.  Respiratory: No cough, shortness of breath, congestion, or wheezing.  Gastrointestinal: No abdominal pain, nausea, vomiting, or diarrhea.  Genitourinary: No dysuria.  Musculoskeletal: No joint swelling.  Neurologic: No headache.    PHYSICAL EXAM:  GENERAL: NAD  EYES: EOMI, PERRLA  NECK: Supple, No JVD  CHEST/LUNG: dec breath sounds rt base  HEART:  S1 , S2 +  ABDOMEN: soft , bs+  EXTREMITIES:  no edema  NEUROLOGY:alert awake oriented       LABS:                        14.4   10.88 )-----------( 382      ( 2021 15:40 )             42.7         135  |  98  |  14  ----------------------------<  129<H>  4.0   |  27  |  1.02    Ca    9.3      2021 15:40    TPro  8.4<H>  /  Alb  4.3  /  TBili  0.8  /  DBili  x   /  AST  15  /  ALT  25  /  AlkPhos  105      PT/INR - ( 2021 15:40 )   PT: 14.4 sec;   INR: 1.28 ratio         PTT - ( 2021 15:40 )  PTT:30.6 sec      Urinalysis Basic - ( 2021 17:37 )    Color: Yellow / Appearance: Clear / S.033 / pH: x  Gluc: x / Ketone: Negative  / Bili: Negative / Urobili: 3 mg/dL   Blood: x / Protein: 30 mg/dL / Nitrite: Negative   Leuk Esterase: Negative / RBC: 1 /HPF / WBC 1 /HPF   Sq Epi: x / Non Sq Epi: 0 /HPF / Bacteria: Negative        RADIOLOGY & ADDITIONAL TESTS:    Imaging Personally Reviewed:    Consultant(s) Notes Reviewed:      Care Discussed with Consultants/Other Providers:

## 2021-03-01 NOTE — CONSULT NOTE ADULT - SUBJECTIVE AND OBJECTIVE BOX
DATE OF SERVICE: 03/01/2021   Patient was seen,examined and evaluated  by me.ER evaluation, Labs and Hospital course was reviewed,    CHIEF COMPLAINT:Back Pain    HPI:39 yo Male with recent COVID-19 infection presenting with right back pain.   He was in his usual state of health until 2/11 when he tested positive for COVID-19.   He had begun noticing dry cough intermittently productive of yellow/green sputum, fevers, chills, one episode of rigors, anosmia, loss of taste.   He self-quarantined for 14 days over which time his fevers, chills, and cough resolved but his anosmia and loss of taste persisted.   A repeat COVID-19 test was negative outpatient.   Then, on Friday night he began noticing 7-8/10 right back pain that is worse with movements, breathing, and cough, which continued to progress to 10/10 pain on Saturday. He had no associated shortness of breath or cough nor dyspnea on exertion. When his right back pain persisted he presented to the emergency department for evaluation.    ED course:  T 99.1-99.8,  > 1 L NS > 93, -123/75-86. In addition to 1  L NS, he received ketorolac 15 mg IV which controlled his pain from 10/10 to 5/10. (28 Feb 2021 20:00)      PAST MEDICAL & SURGICAL HISTORY:  Pneumonia due to COVID-19 virus  No significant past surgical history        MEDICATIONS  (STANDING):  enoxaparin Injectable 70 milliGRAM(s) SubCutaneous two times a day  influenza   Vaccine 0.5 milliLiter(s) IntraMuscular once        FAMILY HISTORY:  FH: HTN (hypertension)      No family history of premature coronary artery disease or sudden cardiac death    SOCIAL HISTORY:  Smoking-[ ] Active  [ ] Former [ ] Non Smoker  Alcohol-[ ] Denies [ ] Social [ ] Daily  Ilicit Drug use-[ ] Denies [ ] Active user    REVIEW OF SYSTEMS:  Constitutional: [ ] fever, [ ]weight loss, [ ]fatigue   Activity [ ] Bedbound,[ ] Ambulates [ ] Unassisted[ ] Cane/Walker [ ] Assistence.  Effort tolerance:[ ] Excellent [ ] Good [ ] Fair [ ] Poor [ ]  Eyes: [ ] visual changes  Respiratory: [ ]shortness of breath;  [ ] cough, [ ]wheezing, [ ]chills, [ ]hemoptysis  Cardiovascular: [ ] chest pain, [ ]palpitations, [ ]dizziness,  [ ]leg swelling[ ]orthopnea [ ]PND  Gastrointestinal: [ ] abdominal pain, [ ]nausea, [ ]vomiting,  [ ]diarrhea,[ ]constipation  Genitourinary: [ ] dysuria, [ ] hematuria  Neurologic: [ ] headaches [ ] tremors[ ] weakness  Skin: [ ] itching, [ ]burning, [ ] rashes  Endocrine: [ ] heat or cold intolerance  Musculoskeletal: [ ] joint pain or swelling; [ ] muscle, back, or extremity pain  Psychiatric: [ ] depression, [ ]anxiety, [ ]mood swings, or [ ]difficulty sleeping  Hematologic: [ ] easy bruising, [ ] bleeding gums       [ x] All others negative	  [ ] Unable to obtain    Vital Signs Last 24 Hrs  T(C): 37.2 (01 Mar 2021 06:03), Max: 37.7 (28 Feb 2021 15:34)  T(F): 98.9 (01 Mar 2021 06:03), Max: 99.8 (28 Feb 2021 15:34)  HR: 90 (01 Mar 2021 06:03) (85 - 116)  BP: 111/88 (01 Mar 2021 06:03) (111/88 - 124/85)  RR: 18 (01 Mar 2021 06:03) (18 - 32)  SpO2: 97% (01 Mar 2021 06:03) (94% - 98%)      PHYSICAL EXAM:  General: No acute distress BMI-  HEENT: EOMI, PERRL[ ] Icteric  Neck: Supple, No JVD  Lungs: Equal air entry bilaterally; [ ] Rales [ ] Rhonchi [ ] Wheezing  Heart: Regular rate and rhythm;[ ] Murmurs-   /6 [ ] Systolic [ ] Diastolic [ ] Radiation,No rubs, or gallops  Abdomen: Nontender, bowel sounds present  Extremities: No clubbing, cyanosis, or edema[ ] Calf tenderness  Nervous system:  Alert & Oriented X3, no focal deficits  Psychiatric: Normal affect  Skin: No rashes or lesions      LABS:  02-28    135  |  98  |  14  ----------------------------<  129<H>  4.0   |  27  |  1.02    Ca    9.3      28 Feb 2021 15:40    TPro  8.4<H>  /  Alb  4.3  /  TBili  0.8  /  DBili  x   /  AST  15  /  ALT  25  /  AlkPhos  105  02-28    Creatinine Trend: 1.02<--                        14.4   10.88 )-----------( 382      ( 28 Feb 2021 15:40 )             42.7     PT/INR - ( 28 Feb 2021 15:40 )   PT: 14.4 sec;   INR: 1.28 ratio     PTT - ( 28 Feb 2021 15:40 )  PTT:30.6 sec    Serum Pro-Brain Natriuretic Peptide: 251 pg/mL (02-28-21 @ 15:41)        RADIOLOGY: CT ANGIO CHEST (W)AW IC    IMPRESSION:  Right-sided pulmonary emboli, question right heart strain. Echo recommended for complete evaluation.  Bilateral subtle patchy lung opacities may be infectious or inflammatory.    ECG [my interpretation]:    TELEMETRY:    ECHO:    STRESS TEST:    CATHETERIZATION: DATE OF SERVICE: 03/01/2021   Patient was seen,examined and evaluated  by me.ER evaluation, Labs and Hospital course was reviewed,    CHIEF COMPLAINT:Back Pain    HPI:41 yo Male with recent COVID-19 infection presenting with right back pain.   He was in his usual state of health until 2/11 when he tested positive for COVID-19.   He had begun noticing dry cough intermittently productive of yellow/green sputum, fevers, chills, one episode of rigors, anosmia, loss of taste.   He self-quarantined for 14 days over which time his fevers, chills, and cough resolved but his anosmia and loss of taste persisted.   A repeat COVID-19 test was negative outpatient.   Then, on Friday night he began noticing 7-8/10 right back pain that is worse with movements, breathing, and cough, which continued to progress to 10/10 pain on Saturday. He had no associated shortness of breath or cough nor dyspnea on exertion. When his right back pain persisted he presented to the emergency department for evaluation.    ED course:  T 99.1-99.8,  > 1 L NS > 93, -123/75-86. In addition to 1  L NS, he received ketorolac 15 mg IV which controlled his pain from 10/10 to 5/10. (28 Feb 2021 20:00)      PAST MEDICAL & SURGICAL HISTORY:  Pneumonia due to COVID-19 virus  No significant past surgical history        MEDICATIONS  (STANDING):  enoxaparin Injectable 70 milliGRAM(s) SubCutaneous two times a day  influenza   Vaccine 0.5 milliLiter(s) IntraMuscular once        FAMILY HISTORY:  FH: HTN (hypertension)      No family history of premature coronary artery disease or sudden cardiac death    SOCIAL HISTORY:  Smoking-[ ] Active  [ ] Former [x ] Non Smoker  Alcohol-[x ] Denies [ ] Social [ ] Daily  Ilicit Drug use-[x ] Denies [ ] Active user    REVIEW OF SYSTEMS:  Constitutional: [ ] fever, [ ]weight loss, [x ]fatigue   Activity [ ] Bedbound,[ x] Ambulates [x ] Unassisted[ ] Cane/Walker [ ] Assistence.  Effort tolerance:[ ] Excellent [x ] Good [ ] Fair [ ] Poor [ ]  Eyes: [ ] visual changes  Respiratory: [ ]shortness of breath;  [ ] cough, [ ]wheezing, [ ]chills, [ ]hemoptysis  Cardiovascular: [ ] chest pain, [ ]palpitations, [ ]dizziness,  [ ]leg swelling[ ]orthopnea [ ]PND  Gastrointestinal: [ ] abdominal pain, [ ]nausea, [ ]vomiting,  [ ]diarrhea,[ ]constipation  Genitourinary: [ ] dysuria, [ ] hematuria  Neurologic: [ ] headaches [ ] tremors[ ] weakness  Skin: [ ] itching, [ ]burning, [ ] rashes  Endocrine: [ ] heat or cold intolerance  Musculoskeletal: [ ] joint pain or swelling; [x ] muscle, back, or extremity pain  Psychiatric: [ ] depression, [ ]anxiety, [ ]mood swings, or [ ]difficulty sleeping  Hematologic: [ ] easy bruising, [ ] bleeding gums       [ x] All others negative	  [ ] Unable to obtain    Vital Signs Last 24 Hrs  T(C): 37.2 (01 Mar 2021 06:03), Max: 37.7 (28 Feb 2021 15:34)  T(F): 98.9 (01 Mar 2021 06:03), Max: 99.8 (28 Feb 2021 15:34)  HR: 90 (01 Mar 2021 06:03) (85 - 116)  BP: 111/88 (01 Mar 2021 06:03) (111/88 - 124/85)  RR: 18 (01 Mar 2021 06:03) (18 - 32)  SpO2: 97% (01 Mar 2021 06:03) (94% - 98%)      PHYSICAL EXAM:  General: No acute distress BMI-26  HEENT: EOMI, PERRL[ ] Icteric  Neck: Supple, No JVD  Lungs: Equal air entry bilaterally; [ ] Rales [ ] Rhonchi [ ] Wheezing  Heart: Regular rate and rhythm;[x ] Murmurs-   2/6 [x ] Systolic [ ] Diastolic [ ] Radiation,No rubs, or gallops  Abdomen: Nontender, bowel sounds present  Extremities: No clubbing, cyanosis, or edema[ ] Calf tenderness  Nervous system:  Alert & Oriented X3, no focal deficits  Psychiatric: Normal affect  Skin: No rashes or lesions      LABS:  02-28    135  |  98  |  14  ----------------------------<  129<H>  4.0   |  27  |  1.02    Ca    9.3      28 Feb 2021 15:40    TPro  8.4<H>  /  Alb  4.3  /  TBili  0.8  /  DBili  x   /  AST  15  /  ALT  25  /  AlkPhos  105  02-28    Creatinine Trend: 1.02<--                        14.4   10.88 )-----------( 382      ( 28 Feb 2021 15:40 )             42.7     PT/INR - ( 28 Feb 2021 15:40 )   PT: 14.4 sec;   INR: 1.28 ratio     PTT - ( 28 Feb 2021 15:40 )  PTT:30.6 sec    Serum Pro-Brain Natriuretic Peptide: 251 pg/mL (02-28-21 @ 15:41)        RADIOLOGY: CT ANGIO CHEST (W)AW IC    IMPRESSION:  Right-sided pulmonary emboli, question right heart strain. Echo recommended for complete evaluation.  Bilateral subtle patchy lung opacities may be infectious or inflammatory.    ECG [my interpretation]:Sinus Rhythm no acute ST T wave abnormalities

## 2021-03-02 LAB
ALBUMIN SERPL ELPH-MCNC: 3.4 G/DL — SIGNIFICANT CHANGE UP (ref 3.3–5)
ALP SERPL-CCNC: 87 U/L — SIGNIFICANT CHANGE UP (ref 40–120)
ALT FLD-CCNC: 25 U/L — SIGNIFICANT CHANGE UP (ref 4–41)
ANION GAP SERPL CALC-SCNC: 10 MMOL/L — SIGNIFICANT CHANGE UP (ref 7–14)
AST SERPL-CCNC: 20 U/L — SIGNIFICANT CHANGE UP (ref 4–40)
BASOPHILS # BLD AUTO: 0.01 K/UL — SIGNIFICANT CHANGE UP (ref 0–0.2)
BASOPHILS NFR BLD AUTO: 0.2 % — SIGNIFICANT CHANGE UP (ref 0–2)
BILIRUB SERPL-MCNC: 0.4 MG/DL — SIGNIFICANT CHANGE UP (ref 0.2–1.2)
BUN SERPL-MCNC: 9 MG/DL — SIGNIFICANT CHANGE UP (ref 7–23)
CALCIUM SERPL-MCNC: 9.2 MG/DL — SIGNIFICANT CHANGE UP (ref 8.4–10.5)
CHLORIDE SERPL-SCNC: 102 MMOL/L — SIGNIFICANT CHANGE UP (ref 98–107)
CO2 SERPL-SCNC: 24 MMOL/L — SIGNIFICANT CHANGE UP (ref 22–31)
CREAT SERPL-MCNC: 0.91 MG/DL — SIGNIFICANT CHANGE UP (ref 0.5–1.3)
CULTURE RESULTS: NO GROWTH — SIGNIFICANT CHANGE UP
EOSINOPHIL # BLD AUTO: 0.03 K/UL — SIGNIFICANT CHANGE UP (ref 0–0.5)
EOSINOPHIL NFR BLD AUTO: 0.6 % — SIGNIFICANT CHANGE UP (ref 0–6)
GLUCOSE SERPL-MCNC: 131 MG/DL — HIGH (ref 70–99)
HCT VFR BLD CALC: 38.7 % — LOW (ref 39–50)
HGB BLD-MCNC: 13 G/DL — SIGNIFICANT CHANGE UP (ref 13–17)
IANC: 3.19 K/UL — SIGNIFICANT CHANGE UP (ref 1.5–8.5)
IMM GRANULOCYTES NFR BLD AUTO: 0.6 % — SIGNIFICANT CHANGE UP (ref 0–1.5)
LYMPHOCYTES # BLD AUTO: 1.72 K/UL — SIGNIFICANT CHANGE UP (ref 1–3.3)
LYMPHOCYTES # BLD AUTO: 31.9 % — SIGNIFICANT CHANGE UP (ref 13–44)
MAGNESIUM SERPL-MCNC: 1.8 MG/DL — SIGNIFICANT CHANGE UP (ref 1.6–2.6)
MCHC RBC-ENTMCNC: 29.3 PG — SIGNIFICANT CHANGE UP (ref 27–34)
MCHC RBC-ENTMCNC: 33.6 GM/DL — SIGNIFICANT CHANGE UP (ref 32–36)
MCV RBC AUTO: 87.2 FL — SIGNIFICANT CHANGE UP (ref 80–100)
MONOCYTES # BLD AUTO: 0.41 K/UL — SIGNIFICANT CHANGE UP (ref 0–0.9)
MONOCYTES NFR BLD AUTO: 7.6 % — SIGNIFICANT CHANGE UP (ref 2–14)
NEUTROPHILS # BLD AUTO: 3.19 K/UL — SIGNIFICANT CHANGE UP (ref 1.8–7.4)
NEUTROPHILS NFR BLD AUTO: 59.1 % — SIGNIFICANT CHANGE UP (ref 43–77)
NRBC # BLD: 0 /100 WBCS — SIGNIFICANT CHANGE UP
NRBC # FLD: 0 K/UL — SIGNIFICANT CHANGE UP
PHOSPHATE SERPL-MCNC: 3.6 MG/DL — SIGNIFICANT CHANGE UP (ref 2.5–4.5)
PLATELET # BLD AUTO: 324 K/UL — SIGNIFICANT CHANGE UP (ref 150–400)
POTASSIUM SERPL-MCNC: 4.3 MMOL/L — SIGNIFICANT CHANGE UP (ref 3.5–5.3)
POTASSIUM SERPL-SCNC: 4.3 MMOL/L — SIGNIFICANT CHANGE UP (ref 3.5–5.3)
PROT SERPL-MCNC: 7.3 G/DL — SIGNIFICANT CHANGE UP (ref 6–8.3)
RBC # BLD: 4.44 M/UL — SIGNIFICANT CHANGE UP (ref 4.2–5.8)
RBC # FLD: 12.2 % — SIGNIFICANT CHANGE UP (ref 10.3–14.5)
SODIUM SERPL-SCNC: 136 MMOL/L — SIGNIFICANT CHANGE UP (ref 135–145)
SPECIMEN SOURCE: SIGNIFICANT CHANGE UP
WBC # BLD: 5.39 K/UL — SIGNIFICANT CHANGE UP (ref 3.8–10.5)
WBC # FLD AUTO: 5.39 K/UL — SIGNIFICANT CHANGE UP (ref 3.8–10.5)

## 2021-03-02 PROCEDURE — 93306 TTE W/DOPPLER COMPLETE: CPT | Mod: 26

## 2021-03-02 PROCEDURE — 99233 SBSQ HOSP IP/OBS HIGH 50: CPT

## 2021-03-02 RX ORDER — APIXABAN 2.5 MG/1
5 TABLET, FILM COATED ORAL
Refills: 0 | Status: DISCONTINUED | OUTPATIENT
Start: 2021-03-03 | End: 2021-03-03

## 2021-03-02 RX ADMIN — ENOXAPARIN SODIUM 70 MILLIGRAM(S): 100 INJECTION SUBCUTANEOUS at 17:44

## 2021-03-02 RX ADMIN — ENOXAPARIN SODIUM 70 MILLIGRAM(S): 100 INJECTION SUBCUTANEOUS at 05:18

## 2021-03-02 NOTE — PROGRESS NOTE ADULT - SUBJECTIVE AND OBJECTIVE BOX
PULMONARY FOLLOW UP NOTE      ISAMAR DELAROSA  MRN-[id number]    Patient is a 40y old  Male who presents with a chief complaint of Back pain (01 Mar 2021 13:11)      HISTORY OF PRESENT ILLNESS:  on room air  wants to go home  awaiting ECHO        SOCIAL HISTORY  Smoking History: denies    REVIEW OF SYSTEMS: neg    MEDICATIONS  (STANDING):  enoxaparin Injectable 70 milliGRAM(s) SubCutaneous two times a day  influenza   Vaccine 0.5 milliLiter(s) IntraMuscular once    MEDICATIONS  (PRN):        PHYSICAL EXAMINATION:  ICU Vital Signs Last 24 Hrs  T(C): 36.8 (02 Mar 2021 09:15), Max: 37 (01 Mar 2021 10:56)  T(F): 98.2 (02 Mar 2021 09:15), Max: 98.6 (01 Mar 2021 10:56)  HR: 100 (02 Mar 2021 09:15) (88 - 100)  BP: 109/76 (02 Mar 2021 09:15) (109/65 - 122/72)  BP(mean): --  ABP: --  ABP(mean): --  RR: 16 (02 Mar 2021 09:15) (16 - 18)  SpO2: 100% (02 Mar 2021 09:15) (97% - 100%)    GENERAL: The patient is a well-developed, well-nourished male in no apparent distress.     LUNGS:  Respirations unlabored        LABS:                        13.0   5.39  )-----------( 324      ( 02 Mar 2021 08:00 )             38.7   03-02    136  |  102  |  9   ----------------------------<  131<H>  4.3   |  24  |  0.91    Ca    9.2      02 Mar 2021 08:00  Phos  3.6     03-02  Mg     1.8     03-02    TPro  7.3  /  Alb  3.4  /  TBili  0.4  /  DBili  x   /  AST  20  /  ALT  25  /  AlkPhos  87  03-02         RADIOLOGY & ADDITIONAL STUDIES:  ra< from: CT Angio Chest w/ IV Cont (02.28.21 @ 17:06) >    EXAM:  CT ANGIO CHEST (W)AW IC        PROCEDURE DATE:  Feb 28 2021         INTERPRETATION:  EXAMINATION: CT ANGIO CHEST WITHOUT AND OR WITH IV CONTRAST    CLINICAL INDICATION: Dyspnea    TECHNIQUE: CTA of the chest was performed for evaluation of pulmonary embolism after administration of 90ml of Omnipaque-350, 10ml discarded.  MIP images were reconstructed.    COMPARISON: None.    FINDINGS:    PULMONARY ARTERIES: Right upper and lower lobe segmental and subsegmental pulmonary emboli.    AIRWAYS AND LUNGS: The central tracheobronchial tree is patent.  Subtle patchy bilateral lung opacities. Bubbly consolidation right lower lobe likely represents pulmonary infarct.    MEDIASTINUM AND PLEURA: There are no enlarged mediastinal, hilar or axillary lymph nodes. The visualized portion of the thyroid gland is unremarkable. There is a small right pleural effusion. There is no pneumothorax.    HEART AND VESSELS: The heart is normal in size. Slightly enlarged right heart.  There are no atherosclerotic calcifications of the aorta.  There is no pericardial effusion.    UPPER ABDOMEN: Images of the upper abdomen demonstrate no abnormality.    BONES AND SOFT TISSUES: The bones are unremarkable.  The soft tissues are unremarkable.    TUBES/LINES: None.    IMPRESSION:  Right-sided pulmonary emboli, question right heart strain. Echo recommended for complete evaluation.    Bilateral subtle patchy lung opacities may be infectious or inflammatory.    These findings were discussed with Dr. SOLANO  at 2/28/2021 5:50 PM by Dr. Sonia lennon back confirmation.              BRICE ADAMS MD; Attending Radiologist  This document has been electronically signed. Feb 28 2021  6:01PM    < end of copied text >  < from: Xray Chest 1 View AP/PA (02.28.21 @ 15:44) >    EXAM:  XR CHEST AP OR PA 1V        PROCEDURE DATE:  Feb 28 2021         INTERPRETATION:  INDICATION: Cough. Right pleuritic back pain.    TECHNIQUE: Single AP view of the chest.    COMPARISON: None.    FINDINGS: The cardiac silhouette is normal in size. The lung volumes are slightly low. There are no focal consolidations or pleural effusions. The hilar and mediastinal structures appear unremarkable.    IMPRESSION: Slightly low lung volumes with clear lungs.        COY HERNANDEZ MD; Attending Radiologist  This document has been electronically signed. Feb 28 2021  3:52PM    < end of copied text >    ECHO:  none    ASSESSMENT:  41 yo with COVID in feb now with PE    PLAN:  Check TTE r/o heart strain  check US dopplers  likely PE due to COVID 19  consider switch of lovenox to DOAC      Thank you for allowing me to participate in the care of this patient.  Please feel free to call me for any questions/concerns.      Shannan Mtz MD  [facility] Pulmonary/Sleep Medicine  [phone]

## 2021-03-02 NOTE — PROGRESS NOTE ADULT - SUBJECTIVE AND OBJECTIVE BOX
DATE OF SERVICE:  Patient was seen and examined ,interim events noted.Consultant notes ,Labs,Telemetry reviewed by me    PRESENTING CC:    HPI and HOSPITAL COURSE: HPI:  41 yo Male with recent COVID-19 infection presenting with right back pain. He was in his usual state of health until 2/11 when he tested positive for COVID-19. He had begun noticing dry cough intermittently productive of yellow/green sputum, fevers, chills, one episode of rigors, anosmia, loss of taste. He self-quarantined for 14 days over which time his fevers, chills, and cough resolved but his anosmia and loss of taste persisted. A repeat COVID-19 test was negative outpatient. Then, on Friday night he began noticing 7-8/10 right back pain that is worse with movements, breathing, and cough, which continued to progress to 10/10 pain on Saturday. He had no associated shortness of breath or cough nor dyspnea on exertion. When his right back pain persisted he presented to the emergency department for evaluation.    ED course:  T 99.1-99.8,  > 1 L NS > 93, -123/75-86. In addition to 1  L NS, he received ketorolac 15 mg IV which controlled his pain from 10/10 to 5/10. (28 Feb 2021 20:00)      INTERIM EVENTS:      PMH -reviewed admission note, no change since admission  Heart Failure: Acute [ ] Chronic [ ] Acute on Chronic [ ] Diastolic [ ] Systolic [ ] Combined Systolic and Diastolic[ ]  KESHAV[ ]  ATN[ ]  CKD I [ ] CKDII [ ] CKD III [ ] CKD IV [ ] CKD V [ ] ESRD[ ]  HTN[ ] CVA[ ] DM[ ] COPD[ ] COVID[ ] AF[ ]  PPM[ ] ICD[ ]    MEDICATIONS  (STANDING):  enoxaparin Injectable 70 milliGRAM(s) SubCutaneous two times a day  influenza   Vaccine 0.5 milliLiter(s) IntraMuscular once    MEDICATIONS  (PRN):            REVIEW OF SYSTEMS:  Constitutional: [ ] fever, [ ]weight loss,  [ ]fatigue  Eyes: [ ] visual changes  Respiratory: [ ]shortness of breath;  [ ] cough, [ ]wheezing, [ ]chills, [ ]hemoptysis  Cardiovascular: [ ] chest pain, [ ]palpitations, [ ]dizziness,  [ ]leg swelling[ ]orthopnea[ ]PND  Gastrointestinal: [ ] abdominal pain, [ ]nausea, [ ]vomiting,  [ ]diarrhea [ ]Constipation [ ]Melena  Genitourinary: [ ] dysuria, [ ] hematuria [ ]Wilkinson  Neurologic: [ ] headaches [ ] tremors[ ]weakness [ ]Paralysis Right[ ] Left[ ]  Skin: [ ] itching, [ ]burning, [ ] rashes  Endocrine: [ ] heat or cold intolerance  Musculoskeletal: [ ] joint pain or swelling; [ ] muscle, back, or extremity pain  Psychiatric: [ ] depression, [ ]anxiety, [ ]mood swings, or [ ]difficulty sleeping  Hematologic: [ ] easy bruising, [ ] bleeding gums    [x] All remaining systems negative except as per above.   [ ]Unable to obtain.    Vital Signs Last 24 Hrs  T(C): 36.7 (02 Mar 2021 05:15), Max: 37 (01 Mar 2021 10:56)  T(F): 98 (02 Mar 2021 05:15), Max: 98.6 (01 Mar 2021 10:56)  HR: 89 (02 Mar 2021 05:15) (88 - 92)  BP: 109/65 (02 Mar 2021 05:15) (109/65 - 122/72)  BP(mean): --  RR: 18 (02 Mar 2021 05:15) (18 - 18)  SpO2: 97% (02 Mar 2021 05:15) (97% - 99%)  I&O's Summary      PHYSICAL EXAM:  General: No acute distress BMI-  HEENT: EOMI, PERRL  Neck: Supple, [ ] JVD  Lungs: Equal air entry bilaterally; [ ] rales [ ] wheezing [ ] rhonchi  Heart: Regular rate and rhythm; [ ] murmur   /6 [ ] systolic [ ] diastolic [ ] radiation[ ] rubs [ ]  gallops  Abdomen: Nontender, bowel sounds present  Extremities: No clubbing, cyanosis, [ ] edema [ ]Pulses  equal and intact  Nervous system:  Alert & Oriented X3, no focal deficits  Psychiatric: Normal affect  Skin: No rashes or lesions    LABS:  02-28    135  |  98  |  14  ----------------------------<  129<H>  4.0   |  27  |  1.02    Ca    9.3      28 Feb 2021 15:40    TPro  8.4<H>  /  Alb  4.3  /  TBili  0.8  /  DBili  x   /  AST  15  /  ALT  25  /  AlkPhos  105  02-28    Creatinine Trend: 1.02<--                        14.4   10.88 )-----------( 382      ( 28 Feb 2021 15:40 )             42.7     PT/INR - ( 28 Feb 2021 15:40 )   PT: 14.4 sec;   INR: 1.28 ratio         PTT - ( 28 Feb 2021 15:40 )  PTT:30.6 sec    Cardiac Enzymes:     Serum Pro-Brain Natriuretic Peptide: 251 pg/mL (02-28-21 @ 15:41)        RADIOLOGY:    ECG [my interpretation]:    TELEMETRY:Reviewed monitor tracings-    ECHO:    STRESS TEST:    CATHETERIZATION:      IMPRESSION AND PLAN:       DATE OF SERVICE: 03/02/2021 Patient was seen and examined ,interim events noted.Consultant notes ,Labs,Telemetry reviewed by me    PRESENTING CC:Back pain    HPI and HOSPITAL COURSE: HPI:  39 yo Male with recent COVID-19 infection presenting with right back pain. He was in his usual state of health until 2/11 when he tested positive for COVID-19. He had begun noticing dry cough intermittently productive of yellow/green sputum, fevers, chills, one episode of rigors, anosmia, loss of taste. He self-quarantined for 14 days over which time his fevers, chills, and cough resolved but his anosmia and loss of taste persisted. A repeat COVID-19 test was negative outpatient. Then, on Friday night he began noticing 7-8/10 right back pain that is worse with movements, breathing, and cough, which continued to progress to 10/10 pain on Saturday. He had no associated shortness of breath or cough nor dyspnea on exertion. When his right back pain persisted he presented to the emergency department for evaluation.    ED course:  T 99.1-99.8,  > 1 L NS > 93, -123/75-86. In addition to 1  L NS, he received ketorolac 15 mg IV which controlled his pain from 10/10 to 5/10. (28 Feb 2021 20:00)      INTERIM EVENTS:Awake alert no dyspnea no arrhythmias noted      PMH -reviewed admission note, no change since admission  Heart Failure: Acute [ ] Chronic [ ] Acute on Chronic [ ] Diastolic [ ] Systolic [ ] Combined Systolic and Diastolic[ ]  KESHAV[ ]  ATN[ ]  CKD I [ ] CKDII [ ] CKD III [ ] CKD IV [ ] CKD V [ ] ESRD[ ]  HTN[ ] CVA[ ] DM[ ] COPD[ ] COVID[ ] AF[ ]  PPM[ ] ICD[ ]    MEDICATIONS  (STANDING):  enoxaparin Injectable 70 milliGRAM(s) SubCutaneous two times a day  influenza   Vaccine 0.5 milliLiter(s) IntraMuscular once        REVIEW OF SYSTEMS:  Constitutional: [ ] fever, [ ]weight loss,  [ ]fatigue  Eyes: [ ] visual changes  Respiratory: [ ]shortness of breath;  [ ] cough, [ ]wheezing, [ ]chills, [ ]hemoptysis  Cardiovascular: [ ] chest pain, [ ]palpitations, [ ]dizziness,  [ ]leg swelling[ ]orthopnea[ ]PND  Gastrointestinal: [ ] abdominal pain, [ ]nausea, [ ]vomiting,  [ ]diarrhea [ ]Constipation [ ]Melena  Genitourinary: [ ] dysuria, [ ] hematuria [ ]Wilkinson  Neurologic: [ ] headaches [ ] tremors[ ]weakness [ ]Paralysis Right[ ] Left[ ]  Skin: [ ] itching, [ ]burning, [ ] rashes  Endocrine: [ ] heat or cold intolerance  Musculoskeletal: [ ] joint pain or swelling; [x ] muscle, back, or extremity pain  Psychiatric: [ ] depression, [ ]anxiety, [ ]mood swings, or [ ]difficulty sleeping  Hematologic: [ ] easy bruising, [ ] bleeding gums    [x] All remaining systems negative except as per above.   [ ]Unable to obtain.    Vital Signs Last 24 Hrs  T(C): 36.7 (02 Mar 2021 05:15), Max: 37 (01 Mar 2021 10:56)  T(F): 98 (02 Mar 2021 05:15), Max: 98.6 (01 Mar 2021 10:56)  HR: 89 (02 Mar 2021 05:15) (88 - 92)  BP: 109/65 (02 Mar 2021 05:15) (109/65 - 122/72)  RR: 18 (02 Mar 2021 05:15) (18 - 18)  SpO2: 97% (02 Mar 2021 05:15) (97% - 99%)  I&O's Summary      PHYSICAL EXAM:  General: No acute distress BMI-26  HEENT: EOMI, PERRL  Neck: Supple, [ ] JVD  Lungs: Equal air entry bilaterally; [ ] rales [ ] wheezing [ ] rhonchi  Heart: Regular rate and rhythm; [x ] murmur   2/6 [x ] systolic [ ] diastolic [ ] radiation[ ] rubs [ ]  gallops  Abdomen: Nontender, bowel sounds present  Extremities: No clubbing, cyanosis, [ ] edema [ ]Pulses  equal and intact  Nervous system:  Alert & Oriented X3, no focal deficits  Psychiatric: Normal affect  Skin: No rashes or lesions    LABS:  02-28    135  |  98  |  14  ----------------------------<  129<H>  4.0   |  27  |  1.02    Ca    9.3      28 Feb 2021 15:40    TPro  8.4<H>  /  Alb  4.3  /  TBili  0.8  /  DBili  x   /  AST  15  /  ALT  25  /  AlkPhos  105  02-28    Creatinine Trend: 1.02<--                        14.4   10.88 )-----------( 382      ( 28 Feb 2021 15:40 )             42.7     PT/INR - ( 28 Feb 2021 15:40 )   PT: 14.4 sec;   INR: 1.28 ratio         PTT - ( 28 Feb 2021 15:40 )  PTT:30.6 sec    Cardiac Enzymes:     Serum Pro-Brain Natriuretic Peptide: 251 pg/mL (02-28-21 @ 15:41)        IMPRESSION AND PLAN:  40 M with recent COVID-19 infection now presenting with back pain a/w SIRS due to PE without troponinemia and equivocal CT findings of Rt heart strain.    Problem/Plan - 1:  ·  Problem: SIRS (systemic inflammatory response syndrome).  Plan: Tachycardia, HR in 130-90s; RR=30s-20s; Due to acute post covid-19 PE; WBC=11K  -Hold Abx  -VS q4h     Problem/Plan - 2:  ·  Problem: Pulmonary embolism.  Plan: Provoked pulmonary embolism with CT equivocal for Rt heart strain  -Concern for Rt heart strain clinically low given troponin 6 , ProBNP 251 and no exertional dyspnea  - TTE-Normal LV Systolic Function  -Lovenox 1 mg/kg BID  -Transition to Eliquis     Problem/Plan - 3:  ·  Problem: Acute thoracic back pain, unspecified back pain laterality.  Plan: Due to PE; Likely pleuritic etiology;   -Pain control.     Problem/Plan - 4:  ·  Problem: COVID-19.  Plan: Prior COVID-19 infection still PCR+  -No role for remdesivir or steroids  -Lovenox as above.     Problem/Plan - 5:  ·  Problem: Preventive measure.  Plan: Lovenox 1 mg/kg as above  Regular diet  Normal activity.

## 2021-03-02 NOTE — PROGRESS NOTE ADULT - PROBLEM SELECTOR PLAN 3
Prior COVID-19 infection still PCR+  -No role for remdesivir or steroids  -Lovenox as above
Due to PE; Likely pleuritic etiology;   -Pain control

## 2021-03-02 NOTE — PROGRESS NOTE ADULT - PROBLEM SELECTOR PLAN 4
Lovenox 1 mg/kg as above  Regular diet  Normal activity
Prior COVID-19 infection still PCR+  -No role for remdesivir or steroids  -Lovenox as above

## 2021-03-02 NOTE — PROGRESS NOTE ADULT - PROBLEM SELECTOR PLAN 2
Provoked pulmonary embolism with CT equivocal for Rt heart strain  -Concern for Rt heart strain clinically low given troponin 6 , ProBNP 251 and no exertional dyspnea  -F/u TTE  -Lovenox 1 mg/kg BID  -Transition to Eliquis or Xarelto depending on patient's insurance
Due to PE; Likely pleuritic etiology;   -Pain control

## 2021-03-02 NOTE — PROGRESS NOTE ADULT - ATTENDING COMMENTS
Patient was seen and examined by me on 03/02/2021,interim events noted,labs and radiology studies reviewed.  James Diaz MD,[facility]  [address] [place] [address].  [id number]. [phone]

## 2021-03-02 NOTE — PROGRESS NOTE ADULT - PROBLEM SELECTOR PLAN 1
Tachycardia, HR in 130-90s; RR=30s-20s; Due to acute post covid-19 PE; WBC=11K  -Hold Abx  -VS q4h  -Plan as below
Provoked pulmonary embolism with CT equivocal for Rt heart strain  -Concern for Rt heart strain clinically low given troponin 6 , ProBNP 251 and no exertional dyspnea  -F/u TTE  -cont ac

## 2021-03-02 NOTE — PROGRESS NOTE ADULT - ASSESSMENT
40 M with recent COVID-19 infection now presenting with back pain a/w SIRS due to PE without troponinemia and equivocal CT findings of Rt heart strain.
40 M with recent COVID-19 infection now presenting with back pain a/w SIRS due to PE without troponinemia and equivocal CT findings of Rt heart strain.

## 2021-03-02 NOTE — PROGRESS NOTE ADULT - SUBJECTIVE AND OBJECTIVE BOX
SUBJECTIVE / OVERNIGHT EVENTS: pt denies chest pain, shortness of breath     MEDICATIONS  (STANDING):  influenza   Vaccine 0.5 milliLiter(s) IntraMuscular once    MEDICATIONS  (PRN):    Vital Signs Last 24 Hrs  T(C): 36.7 (02 Mar 2021 20:58), Max: 36.8 (02 Mar 2021 09:15)  T(F): 98 (02 Mar 2021 20:58), Max: 98.2 (02 Mar 2021 09:15)  HR: 98 (02 Mar 2021 20:58) (87 - 100)  BP: 121/70 (02 Mar 2021 20:58) (104/70 - 121/70)  BP(mean): --  RR: 16 (02 Mar 2021 20:58) (16 - 18)  SpO2: 97% (02 Mar 2021 20:58) (97% - 100%)    Constitutional: No fever, fatigue  Skin: No rash.  Eyes: No recent vision problems or eye pain.  ENT: No congestion, ear pain, or sore throat.  Cardiovascular: No chest pain or palpation.  Respiratory: No cough, shortness of breath, congestion, or wheezing.  Gastrointestinal: No abdominal pain, nausea, vomiting, or diarrhea.  Genitourinary: No dysuria.  Musculoskeletal: No joint swelling.  Neurologic: No headache.    PHYSICAL EXAM:  GENERAL: NAD  EYES: EOMI, PERRLA  NECK: Supple, No JVD  CHEST/LUNG: dec breath sounds rt base  HEART:  S1 , S2 +  ABDOMEN: soft , bs+  EXTREMITIES:  no edema  NEUROLOGY:alert awake oriented       LABS:      136  |  102  |  9   ----------------------------<  131<H>  4.3   |  24  |  0.91    Ca    9.2      02 Mar 2021 08:00  Phos  3.6     03-02  Mg     1.8     03-02    TPro  7.3  /  Alb  3.4  /  TBili  0.4  /  DBili      /  AST  20  /  ALT  25  /  AlkPhos  87  03-02    Creatinine Trend: 0.91 <--, 1.02 <--                        13.0   5.39  )-----------( 324      ( 02 Mar 2021 08:00 )             38.7     Urine Studies:  Urinalysis Basic - ( 2021 17:37 )    Color: Yellow / Appearance: Clear / S.033 / pH:   Gluc:  / Ketone: Negative  / Bili: Negative / Urobili: 3 mg/dL   Blood:  / Protein: 30 mg/dL / Nitrite: Negative   Leuk Esterase: Negative / RBC: 1 /HPF / WBC 1 /HPF   Sq Epi:  / Non Sq Epi: 0 /HPF / Bacteria: Negative              LIVER FUNCTIONS - ( 02 Mar 2021 08:00 )  Alb: 3.4 g/dL / Pro: 7.3 g/dL / ALK PHOS: 87 U/L / ALT: 25 U/L / AST: 20 U/L / GGT: x                     RADIOLOGY & ADDITIONAL TESTS:    Imaging Personally Reviewed:    Consultant(s) Notes Reviewed:      Care Discussed with Consultants/Other Providers:

## 2021-03-03 ENCOUNTER — TRANSCRIPTION ENCOUNTER (OUTPATIENT)
Age: 41
End: 2021-03-03

## 2021-03-03 VITALS — OXYGEN SATURATION: 96 %

## 2021-03-03 LAB
ANION GAP SERPL CALC-SCNC: 11 MMOL/L — SIGNIFICANT CHANGE UP (ref 7–14)
BUN SERPL-MCNC: 10 MG/DL — SIGNIFICANT CHANGE UP (ref 7–23)
CALCIUM SERPL-MCNC: 9.8 MG/DL — SIGNIFICANT CHANGE UP (ref 8.4–10.5)
CHLORIDE SERPL-SCNC: 97 MMOL/L — LOW (ref 98–107)
CO2 SERPL-SCNC: 25 MMOL/L — SIGNIFICANT CHANGE UP (ref 22–31)
CREAT SERPL-MCNC: 1.01 MG/DL — SIGNIFICANT CHANGE UP (ref 0.5–1.3)
GLUCOSE SERPL-MCNC: 105 MG/DL — HIGH (ref 70–99)
HCT VFR BLD CALC: 45.3 % — SIGNIFICANT CHANGE UP (ref 39–50)
HGB BLD-MCNC: 14.7 G/DL — SIGNIFICANT CHANGE UP (ref 13–17)
MAGNESIUM SERPL-MCNC: 1.9 MG/DL — SIGNIFICANT CHANGE UP (ref 1.6–2.6)
MCHC RBC-ENTMCNC: 28.5 PG — SIGNIFICANT CHANGE UP (ref 27–34)
MCHC RBC-ENTMCNC: 32.5 GM/DL — SIGNIFICANT CHANGE UP (ref 32–36)
MCV RBC AUTO: 88 FL — SIGNIFICANT CHANGE UP (ref 80–100)
NRBC # BLD: 0 /100 WBCS — SIGNIFICANT CHANGE UP
NRBC # FLD: 0 K/UL — SIGNIFICANT CHANGE UP
PLATELET # BLD AUTO: 402 K/UL — HIGH (ref 150–400)
POTASSIUM SERPL-MCNC: 4.1 MMOL/L — SIGNIFICANT CHANGE UP (ref 3.5–5.3)
POTASSIUM SERPL-SCNC: 4.1 MMOL/L — SIGNIFICANT CHANGE UP (ref 3.5–5.3)
RBC # BLD: 5.15 M/UL — SIGNIFICANT CHANGE UP (ref 4.2–5.8)
RBC # FLD: 12.1 % — SIGNIFICANT CHANGE UP (ref 10.3–14.5)
SODIUM SERPL-SCNC: 133 MMOL/L — LOW (ref 135–145)
WBC # BLD: 6.79 K/UL — SIGNIFICANT CHANGE UP (ref 3.8–10.5)
WBC # FLD AUTO: 6.79 K/UL — SIGNIFICANT CHANGE UP (ref 3.8–10.5)

## 2021-03-03 PROCEDURE — 99233 SBSQ HOSP IP/OBS HIGH 50: CPT

## 2021-03-03 RX ORDER — APIXABAN 2.5 MG/1
1 TABLET, FILM COATED ORAL
Qty: 0 | Refills: 0 | DISCHARGE
Start: 2021-03-03

## 2021-03-03 RX ORDER — APIXABAN 2.5 MG/1
1 TABLET, FILM COATED ORAL
Qty: 60 | Refills: 0
Start: 2021-03-03 | End: 2021-04-01

## 2021-03-03 RX ADMIN — APIXABAN 5 MILLIGRAM(S): 2.5 TABLET, FILM COATED ORAL at 06:03

## 2021-03-03 NOTE — DISCHARGE NOTE PROVIDER - HOSPITAL COURSE
40 M with recent COVID-19 infection presented on 2/28/21 with back pain associated with SIRS due to PE without troponinemia and equivocal CT findings of Rt heart strain.    Hospital Course:   Pulmonary embolism.  Plan: Provoked pulmonary embolism with CT equivocal for Rt heart strain  -Concern for Rt heart strain clinically low given troponin 6 , ProBNP 251 and no exertional dyspnea  -Pulmonary and cardiology have been following the patient  -ECHO showed EF 57%. Right ventricular enlargement with decreased right  ventricular systolic function. No further inpatient workup is required.    Acute thoracic back pain, unspecified back pain laterality.  Plan: Due to PE; Likely pleuritic etiology;   -Pain control    COVID-19.  Plan: Prior COVID-19 infection still PCR+  -supplemental oxygen is not required  -No role for remdesivir or steroids

## 2021-03-03 NOTE — PROGRESS NOTE ADULT - SUBJECTIVE AND OBJECTIVE BOX
DATE OF SERVICE:03/03/2021  Patient was seen and examined ,interim events noted.Consultant notes ,Labs,Telemetry reviewed by me    PRESENTING CC:Back Pain PE    HPI and HOSPITAL COURSE: HPI:  41 yo Male with recent COVID-19 infection presenting with right back pain. He was in his usual state of health until 2/11 when he tested positive for COVID-19. He had begun noticing dry cough intermittently productive of yellow/green sputum, fevers, chills, one episode of rigors, anosmia, loss of taste. He self-quarantined for 14 days over which time his fevers, chills, and cough resolved but his anosmia and loss of taste persisted. A repeat COVID-19 test was negative outpatient. Then, on Friday night he began noticing 7-8/10 right back pain that is worse with movements, breathing, and cough, which continued to progress to 10/10 pain on Saturday. He had no associated shortness of breath or cough nor dyspnea on exertion. When his right back pain persisted he presented to the emergency department for evaluation.    ED course:  T 99.1-99.8,  > 1 L NS > 93, -123/75-86. In addition to 1  L NS, he received ketorolac 15 mg IV which controlled his pain from 10/10 to 5/10. (28 Feb 2021 20:00)      INTERIM EVENTS:Awake feels well no dyspnea back pain resolved no arrhythmias noted on Telemetry      PMH -reviewed admission note, no change since admission  Heart Failure: Acute [ ] Chronic [ ] Acute on Chronic [ ] Diastolic [ ] Systolic [ ] Combined Systolic and Diastolic[ ]  KESHAV[ ]  ATN[ ]  CKD I [ ] CKDII [ ] CKD III [ ] CKD IV [ ] CKD V [ ] ESRD[ ]  HTN[ ] CVA[ ] DM[ ] COPD[ ] COVID[ ] AF[ ]  PPM[ ] ICD[ ]    MEDICATIONS  (STANDING):  apixaban 5 milliGRAM(s) Oral two times a day  influenza   Vaccine 0.5 milliLiter(s) IntraMuscular once      REVIEW OF SYSTEMS:  Constitutional: [ ] fever, [ ]weight loss,  [ ]fatigue  Eyes: [ ] visual changes  Respiratory: [ ]shortness of breath;  [ ] cough, [ ]wheezing, [ ]chills, [ ]hemoptysis  Cardiovascular: [ ] chest pain, [ ]palpitations, [ ]dizziness,  [ ]leg swelling[ ]orthopnea[ ]PND  Gastrointestinal: [ ] abdominal pain, [ ]nausea, [ ]vomiting,  [ ]diarrhea [ ]Constipation [ ]Melena  Genitourinary: [ ] dysuria, [ ] hematuria [ ]Wilkinson  Neurologic: [ ] headaches [ ] tremors[ ]weakness [ ]Paralysis Right[ ] Left[ ]  Skin: [ ] itching, [ ]burning, [ ] rashes  Endocrine: [ ] heat or cold intolerance  Musculoskeletal: [ ] joint pain or swelling; [ ] muscle, back, or extremity pain  Psychiatric: [ ] depression, [ ]anxiety, [ ]mood swings, or [ ]difficulty sleeping  Hematologic: [ ] easy bruising, [ ] bleeding gums    [x] All remaining systems negative except as per above.   [ ]Unable to obtain.    Vital Signs Last 24 Hrs  HR: 94 (03 Mar 2021 15:17) (94 - 94)  BP: 108/72 (03 Mar 2021 15:17) (108/72 - 108/72)  RR: 16 (03 Mar 2021 15:17) (16 - 16)  SpO2: 96% (03 Mar 2021 15:18) (96% - 97%)  I&O's Summary    02 Mar 2021 07:01  -  03 Mar 2021 07:00  --------------------------------------------------------  IN: 800 mL / OUT: 0 mL / NET: 800 mL        PHYSICAL EXAM:  General: No acute distress BMI-26  HEENT: EOMI, PERRL  Neck: Supple, [ ] JVD  Lungs: Equal air entry bilaterally; [ ] rales [ ] wheezing [ ] rhonchi  Heart: Regular rate and rhythm; [x ] murmur  2 /6 [x ] systolic [ ] diastolic [ ] radiation[ ] rubs [ ]  gallops  Abdomen: Nontender, bowel sounds present  Extremities: No clubbing, cyanosis, [ ] edema [ ]Pulses  equal and intact  Nervous system:  Alert & Oriented X3, no focal deficits  Psychiatric: Normal affect  Skin: No rashes or lesions    LABS:  03-03    133<L>  |  97<L>  |  10  ----------------------------<  105<H>  4.1   |  25  |  1.01    Ca    9.8      03 Mar 2021 06:54  Phos  3.6     03-02  Mg     1.9     03-03    TPro  7.3  /  Alb  3.4  /  TBili  0.4  /  DBili  x   /  AST  20  /  ALT  25  /  AlkPhos  87  03-02    Creatinine Trend: 1.01<--, 0.91<--, 1.02<--                        14.7   6.79  )-----------( 402      ( 03 Mar 2021 06:54 )             45.3       TELEMETRY:Reviewed monitor tracings-Sinus rhythm no arrhythmias    ECHO:Study Date: 3/2/2021  CONCLUSIONS:  1. Normal trileaflet aortic valve.  2. Normal left ventricular internal dimensions and wall thicknesses.  3. Normal left ventricular systolic function. No segmental wall motion abnormalities.EF 57%  4. Right ventricular enlargement with decreased right ventricular systolic function.          IMPRESSION AND PLAN:  40 M with recent COVID-19 infection presented on 2/28/21 with back pain associated with SIRS due to PE without troponinemia and equivocal CT findings of Rt heart strain.  TTE no evidence for Right heart strain     Pulmonary embolism.  Plan: Provoked pulmonary embolism with CT equivocal for Rt heart strain  -Concern for Rt heart strain clinically low given troponin 6 , ProBNP 251 and no exertional dyspnea  --ECHO showed EF 57%. Right ventricular enlargement with decreased right  ventricular systolic function. No further inpatient workup is required.  Continue on Eliquis    Acute thoracic back pain, unspecified back pain laterality.  Plan: Due to PE; Likely pleuritic etiology;   -Pain control    COVID-19.  Plan: Prior COVID-19 infection still PCR+  -supplemental oxygen is not required  -No role for remdesivir or steroids

## 2021-03-03 NOTE — DISCHARGE NOTE NURSING/CASE MANAGEMENT/SOCIAL WORK - PATIENT PORTAL LINK FT
You can access the FollowMyHealth Patient Portal offered by [facility] by registering at the following website: [email or web address]. By joining [facility]’s FollowMyHealth portal, you will also be able to view your health information using other applications (apps) compatible with our system.

## 2021-03-03 NOTE — DISCHARGE NOTE PROVIDER - PROVIDER TOKENS
PROVIDER:[TOKEN:[[id number]],FOLLOWUP:[1 week],ESTABLISHEDPATIENT:[T]],PROVIDER:[TOKEN:[[id number]],FOLLOWUP:[1 week],ESTABLISHEDPATIENT:[T]],FREE:[LAST:[zion],FIRST:[bethany],PHONE:[phone]],FAX:[(   )    -],ADDRESS:[Please follow up with your primary care physician Dr. Mason within 1 week of discharge from the hospital]]

## 2021-03-03 NOTE — DISCHARGE NOTE PROVIDER - CARE PROVIDERS DIRECT ADDRESSES
,DirectAddress_Unknown,[email or web address],DirectAddress_Unknown

## 2021-03-03 NOTE — PROGRESS NOTE ADULT - SUBJECTIVE AND OBJECTIVE BOX
PULMONARY FOLLOW UP NOTE      ISAMAR DELAROSA  MRN-[id number]    Patient is a 40y old  Male who presents with a chief complaint of Back pain (01 Mar 2021 13:11)      HISTORY OF PRESENT ILLNESS:  on room air  wants to go home          SOCIAL HISTORY  Smoking History: denies    REVIEW OF SYSTEMS: neg    MEDICATIONS  (STANDING):  apixaban 5 milliGRAM(s) Oral two times a day  influenza   Vaccine 0.5 milliLiter(s) IntraMuscular once    MEDICATIONS  (PRN):      PHYSICAL EXAMINATION:  Vital Signs Last 24 Hrs  T(C): 36.7 (02 Mar 2021 20:58), Max: 36.8 (02 Mar 2021 13:15)  T(F): 98 (02 Mar 2021 20:58), Max: 98.2 (02 Mar 2021 13:15)  HR: 98 (02 Mar 2021 20:58) (87 - 98)  BP: 121/70 (02 Mar 2021 20:58) (104/70 - 121/70)  BP(mean): --  RR: 16 (02 Mar 2021 20:58) (16 - 16)  SpO2: 97% (02 Mar 2021 20:58) (97% - 98%)    GENERAL: The patient is a well-developed, well-nourished male in no apparent distress.     LUNGS:  Respirations unlabored        LABS:                        14.7   6.79  )-----------( 402      ( 03 Mar 2021 06:54 )             45.3   03-03    133<L>  |  97<L>  |  10  ----------------------------<  105<H>  4.1   |  25  |  1.01    Ca    9.8      03 Mar 2021 06:54  Phos  3.6     03-02  Mg     1.9     03-03    TPro  7.3  /  Alb  3.4  /  TBili  0.4  /  DBili  x   /  AST  20  /  ALT  25  /  AlkPhos  87  03-02      < from: US Duplex Venous Lower Ext Complete, Bilateral (03.01.21 @ 17:13) >    EXAM:  US DPLX LWR EXT VEINS COMPL BI        PROCEDURE DATE:  Mar  1 2021         INTERPRETATION:  CLINICAL INFORMATION: Elevated d-dimer. Pulmonary embolism. Covid 19 pneumonia. Evaluate for deep venous thrombosis.    COMPARISON: None available.    TECHNIQUE: Duplex sonography of the BILATERAL LOWER extremity veins with color and spectral Doppler, with and without compression.    FINDINGS:    There is normal compressibility of the bilateral common femoral, femoral and popliteal veins.  Dopplerexamination shows normal spontaneous and phasic flow.    No calf vein thrombosis is detected.    IMPRESSION:    No evidence of deep venous thrombosis in either lower extremity.    < end of copied text >     RADIOLOGY & ADDITIONAL STUDIES:  ra< from: CT Angio Chest w/ IV Cont (02.28.21 @ 17:06) >    EXAM:  CT ANGIO CHEST (W)AW IC        PROCEDURE DATE:  Feb 28 2021         INTERPRETATION:  EXAMINATION: CT ANGIO CHEST WITHOUT AND OR WITH IV CONTRAST    CLINICAL INDICATION: Dyspnea    TECHNIQUE: CTA of the chest was performed for evaluation of pulmonary embolism after administration of 90ml of Omnipaque-350, 10ml discarded.  MIP images were reconstructed.    COMPARISON: None.    FINDINGS:    PULMONARY ARTERIES: Right upper and lower lobe segmental and subsegmental pulmonary emboli.    AIRWAYS AND LUNGS: The central tracheobronchial tree is patent.  Subtle patchy bilateral lung opacities. Bubbly consolidation right lower lobe likely represents pulmonary infarct.    MEDIASTINUM AND PLEURA: There are no enlarged mediastinal, hilar or axillary lymph nodes. The visualized portion of the thyroid gland is unremarkable. There is a small right pleural effusion. There is no pneumothorax.    HEART AND VESSELS: The heart is normal in size. Slightly enlarged right heart.  There are no atherosclerotic calcifications of the aorta.  There is no pericardial effusion.    UPPER ABDOMEN: Images of the upper abdomen demonstrate no abnormality.    BONES AND SOFT TISSUES: The bones are unremarkable.  The soft tissues are unremarkable.    TUBES/LINES: None.    IMPRESSION:  Right-sided pulmonary emboli, question right heart strain. Echo recommended for complete evaluation.    Bilateral subtle patchy lung opacities may be infectious or inflammatory.    These findings were discussed with Dr. SOLANO  at 2/28/2021 5:50 PM by Dr. Sonia lennon back confirmation.              BRICE ADAMS MD; Attending Radiologist  This document has been electronically signed. Feb 28 2021  6:01PM    < end of copied text >  < from: Xray Chest 1 View AP/PA (02.28.21 @ 15:44) >    EXAM:  XR CHEST AP OR PA 1V        PROCEDURE DATE:  Feb 28 2021         INTERPRETATION:  INDICATION: Cough. Right pleuritic back pain.    TECHNIQUE: Single AP view of the chest.    COMPARISON: None.    FINDINGS: The cardiac silhouette is normal in size. The lung volumes are slightly low. There are no focal consolidations or pleural effusions. The hilar and mediastinal structures appear unremarkable.    IMPRESSION: Slightly low lung volumes with clear lungs.        COY HERNANDEZ MD; Attending Radiologist  This document has been electronically signed. Feb 28 2021  3:52PM    < end of copied text >      Patient name: ISAMAR DELAROSA  YOB: 1980   Age: 40 (M)   MR#: [id number]  Study Date: 3/2/2021  [facility] LANDON Zarate  Study quality: Technically good  Referring Physician: Rex Roach MD  Blood Pressure: 104/73 mmHg  Height: 170 cm  Weight: 73 kg  BSA: 1.9 m2  ------------------------------------------------------------------------  PROCEDURE: Transthoracic echocardiogram with 2-D, M-Mode  and complete spectral and color flow Doppler.  INDICATION: Chronic pulmonary embolism (I27.82)  ------------------------------------------------------------------------  DIMENSIONS:  Dimensions:     Normal Values:  LA:     3.1 cm    2.0 - 4.0 cm  Ao:     3.4 cm    2.0 - 3.8 cm  SEPTUM: 0.9 cm    0.6 - 1.2 cm  PWT:    0.9 cm    0.6 - 1.1 cm  LVIDd:  4.1 cm    3.0 - 5.6 cm  LVIDs:  2.9 cm    1.8 - 4.0 cm  Derived Variables:  LVMI: 62 g/m2  RWT: 0.43  Fractional short: 29 %  Ejection Fraction (Teicholtz): 57 %  ------------------------------------------------------------------------  OBSERVATIONS:  Mitral Valve: Normal mitral valve. Mild mitral  regurgitation.  Aortic Root: Normal aortic root.  Aortic Valve: Normal trileaflet aortic valve.  Left Atrium: Normal left atrium.  Left Ventricle: Normal left ventricular systolic function.  No segmental wall motion abnormalities. Normal left  ventricular internal dimensions and wall thicknesses.  (DT:102 ms).  Right Heart: Normal right atrium. Right ventricular  enlargement with decreased right ventricular systolic  function. Normal tricuspid valve. Minimal tricuspid  regurgitation. Normal pulmonic valve.  Pericardium/PleuraNormal pericardium with no pericardial  effusion.  Hemodynamic: Estimated right ventricular systolic pressure  equals 23 mm Hg, assuming right atrial pressure equals 10  mm Hg, consistent with normal pulmonary pressures.  ------------------------------------------------------------------------  CONCLUSIONS:  1. Normal trileaflet aortic valve.  2. Normal left ventricular internal dimensions and wall  thicknesses.  3. Normal left ventricular systolic function. No segmental  wall motion abnormalities.  4. Right ventricular enlargement with decreased right  ventricular systolic function.  ------------------------------------------------------------------------  Confirmed on  3/2/2021 - 18:27:27 by Nicholas Rueda M.D. RPVI    ASSESSMENT:  39 yo with COVID in feb now with PE    PLAN:   RV enlargement, decreased RV function but normal pulm pressures, chronic?  dopplers negative  no O2 requirements  would check ambulatory sats/vitals, if normal ok for dc home on full dose ac  will need close outpt fu with cardiology and pulmonary.      Thank you for allowing me to participate in the care of this patient.  Please feel free to call me for any questions/concerns.      Shannan Mtz MD  [facility] Pulmonary/Sleep Medicine  [phone]

## 2021-03-03 NOTE — DISCHARGE NOTE PROVIDER - NSDCCPCAREPLAN_GEN_ALL_CORE_FT
PRINCIPAL DISCHARGE DIAGNOSIS  Diagnosis: Multiple subsegmental pulmonary emboli without acute cor pulmonale  Assessment and Plan of Treatment: .You came in to the hospital with shortness of breath and tachycardia and was found to have Pulmonary Embolism on CAT scan. You were found to have a clot in your lungs and you were treated with Lovenox for anticoagulation while in patient and transitioned to Eliquis. Please continue taking your Eliquis as directed and follow up with Dr. Diaz (Cardiology) within 1 week of discharge for further care and recommendations. Report to the ED should you feel any shortness of breath, difficulty breathing with exersion, HA, dizziness, and continued or worsening chest pain.

## 2021-03-03 NOTE — PROGRESS NOTE ADULT - ATTENDING COMMENTS
Patient was seen and examined by me on 03/03/2021,interim events noted,labs and radiology studies reviewed.  James Diaz MD,[facility].  [address] [place] [address].  [facility]. [phone]

## 2021-03-03 NOTE — DISCHARGE NOTE PROVIDER - CARE PROVIDER_API CALL
James Diaz)  Cardiology  [address] [place] [address]  Phone: [phone]  Fax: [phone]  Established Patient  Follow Up Time: 1 week    Shannan Mtz)  Critical Care Medicine; Internal Medicine; Pulmonary Disease  [address], [address]  [place] [address]  Phone: [phone]  Fax: [phone]  Established Patient  Follow Up Time: 1 week    bethany donovan  Please follow up with your primary care physician Dr. Donovan within 1 week of discharge from the hospital  Phone: [phone]  Fax: (   )    -  Follow Up Time:

## 2021-03-05 ENCOUNTER — FORM ENCOUNTER (OUTPATIENT)
Age: 41
End: 2021-03-05

## 2021-03-09 PROBLEM — U07.1 COVID-19: Chronic | Status: ACTIVE | Noted: 2021-03-01

## 2021-03-11 ENCOUNTER — APPOINTMENT (OUTPATIENT)
Dept: PULMONOLOGY | Facility: CLINIC | Age: 41
End: 2021-03-11
Payer: COMMERCIAL

## 2021-03-11 PROCEDURE — 99204 OFFICE O/P NEW MOD 45 MIN: CPT | Mod: 95

## 2021-03-11 NOTE — HISTORY OF PRESENT ILLNESS
[Other Location: e.g. School (Enter Location, City,State)___] : at [unfilled], at the time of the visit. [Medical Office: ([facility])___] : at the medical office located in  [Verbal consent obtained from patient] : the patient, [unfilled] [FreeTextEntry1] : 41 y/o M with no significant PMH logged into his [facility] appointment as a post-hospital follow up.\par \par Of note, the patient was first diagnosed with COVID-19 on 02/11.  He was admitted to [facility] from 02/28-03/03 for right sided back pain, noted to have tachycardia and CTA showing right upper and lower lobe segmental and subsegmental pulmonary emboli.  LE dopplers were negative.  ECHO showed right ventricular enlargement with decreased right ventricular systolic function. Normal tricuspid valve. Right ventricular systolic pressure equals 23 mm Hg, assuming right atrial pressure equals 10 mm Hg, consistent with normal pulmonary pressures.  Patient did not require oxygen during his stay and was discharged on Eliis for AC.\par \par Today, he is doing well.  He returned to work on Monday 03/08.  States he has no dyspnea at rest or with exertion.  Feels almost back to normal.\par \par He is taking no other medications at this time.\par

## 2021-03-11 NOTE — ADDENDUM
[FreeTextEntry1] : 41 y/o M with no significant PMH logged into his [facility] appointment as a post-hospital follow up.\par \par 1.  Acute Pulmonary Embolism-\par -First diagnosed with COVID-19 on 02/11\par -Admitted to [facility] from 02/28-03/03 and found to have a right upper and lower lobe segmental and subsegmental pulmonary emboli\par -LE dopplers were negative\par -ECHO showed right ventricular enlargement with decreased right ventricular systolic function. Normal tricuspid valve. Right ventricular systolic pressure equals 23 mm Hg, assuming right atrial pressure equals 10 mm Hg, consistent with normal pulmonary pressures.  \par -Patient did not require oxygen during his stay and was discharged on Eliquis for AC.\par -Today, he is doing well.  He returned to work on Monday 03/08.  States he has no dyspnea at rest or with exertion.  Feels almost back to normal.\par -Patient will require at least 3 months of AC--plan to stop around ~June 1st\par -Counseled patient to make a follow up appointment Mid-May\par -WIll need a repeat ECHO at that same time frame\par \par Andrew Leblanc, \par Pulmonary, Critical Care and Sleep Medicine Attending \par

## 2021-03-25 ENCOUNTER — TRANSCRIPTION ENCOUNTER (OUTPATIENT)
Age: 41
End: 2021-03-25

## 2021-05-17 ENCOUNTER — APPOINTMENT (OUTPATIENT)
Dept: PULMONOLOGY | Facility: CLINIC | Age: 41
End: 2021-05-17
Payer: COMMERCIAL

## 2021-05-17 VITALS
SYSTOLIC BLOOD PRESSURE: 110 MMHG | DIASTOLIC BLOOD PRESSURE: 70 MMHG | WEIGHT: 174 LBS | TEMPERATURE: 98.3 F | HEIGHT: 67 IN | BODY MASS INDEX: 27.31 KG/M2 | RESPIRATION RATE: 16 BRPM | HEART RATE: 68 BPM

## 2021-05-17 DIAGNOSIS — U07.1 COVID-19: ICD-10-CM

## 2021-05-17 PROCEDURE — 99072 ADDL SUPL MATRL&STAF TM PHE: CPT

## 2021-05-17 PROCEDURE — 99214 OFFICE O/P EST MOD 30 MIN: CPT

## 2021-05-17 NOTE — PHYSICAL EXAM
[No Acute Distress] : no acute distress [Normal Oropharynx] : normal oropharynx [Normal Appearance] : normal appearance [No Neck Mass] : no neck mass [Normal Rate/Rhythm] : normal rate/rhythm [Normal S1, S2] : normal s1, s2 [No Murmurs] : no murmurs [No Resp Distress] : no resp distress [No Abnormalities] : no abnormalities [Clear to Auscultation Bilaterally] : clear to auscultation bilaterally [Benign] : benign [Normal Gait] : normal gait [No Clubbing] : no clubbing [No Edema] : no edema [No Cyanosis] : no cyanosis [FROM] : FROM [Normal Color/ Pigmentation] : normal color/ pigmentation [No Focal Deficits] : no focal deficits [Oriented x3] : oriented x3 [Normal Affect] : normal affect

## 2021-05-17 NOTE — HISTORY OF PRESENT ILLNESS
[TextBox_4] : 41 y/o M with no significant PMH presented to the pulmonary clinic for a follow up visit.\par \par He was first diagnosed with COVID-19 on 02/11. Then admitted to [facility] from 02/28-03/03 and found to have right upper and lower lobe segmental and subsegmental pulmonary emboli. LE dopplers were negative. ECHO showed right ventricular enlargement with decreased right ventricular systolic function. Normal tricuspid valve. Right ventricular systolic pressure equals 23 mm Hg, assuming right atrial pressure equals 10 mm Hg, consistent with normal pulmonary pressures. Patient did not require oxygen during his stay and was discharged on Eliquis for AC.\par \par Today, he is doing well. He returned to work on Monday 03/08. States he has no dyspnea at rest or with exertion. He is working out in the gym with no limitations in his ADLs.\par

## 2021-05-17 NOTE — ASSESSMENT
[FreeTextEntry1] : 39 y/o M with no significant PMH logged into his [facility] appointment as a post-hospital follow up.\par \par 1. Acute Pulmonary Embolism-\par -COVID-19 (+) on 02/11\par -Admitted from 02/28-03/03 and found to have a right upper and lower lobe segmental and subsegmental pulmonary emboli\par -LE dopplers were negative\par -ECHO showed right ventricular enlargement with decreased right ventricular systolic function. Normal tricuspid valve. Right ventricular systolic pressure equals 23 mm Hg, assuming right atrial pressure equals 10 mm Hg, consistent with normal pulmonary pressures. \par -Patient is on Eliquis for AC\par -Today, he is doing well, states he is back to work and has no limitations in his ADLs.\par -Patient will complete 3 months of AC--plan to stop ~June 1st\par -Ordered PFTs to assess lung funtion\par -Repeat ECHO to assess the RV\par -Surveillance CT-chest to assess lung parenchyma \par \par Andrew Leblanc, DO\par Pulmonary, Critical Care and Sleep Medicine Attending 
Discharged

## 2022-07-01 NOTE — ED PROVIDER NOTE - NS ED MD DISPO ISOLATION TYPES
NOTIFICATION RETURN TO WORK / SCHOOL    7/1/2022 11:39 AM    Mr. Tracy Ayala  [address] [facility] [place]  [phone]      To Whom It May Concern:    Tracy Ayala is currently under the care of [address] He will return to work/school on: ***    If there are questions or concerns please have the patient contact our office.         Sincerely,      Marcus España MD [facility]/Contact

## 2024-03-28 ENCOUNTER — NON-APPOINTMENT (OUTPATIENT)
Age: 44
End: 2024-03-28

## 2024-04-08 ENCOUNTER — NON-APPOINTMENT (OUTPATIENT)
Age: 44
End: 2024-04-08

## 2024-04-08 ENCOUNTER — APPOINTMENT (OUTPATIENT)
Dept: INTERNAL MEDICINE | Facility: CLINIC | Age: 44
End: 2024-04-08
Payer: COMMERCIAL

## 2024-04-08 VITALS
DIASTOLIC BLOOD PRESSURE: 80 MMHG | TEMPERATURE: 98.4 F | HEART RATE: 78 BPM | HEIGHT: 67 IN | WEIGHT: 171 LBS | SYSTOLIC BLOOD PRESSURE: 112 MMHG | RESPIRATION RATE: 16 BRPM | BODY MASS INDEX: 26.84 KG/M2 | OXYGEN SATURATION: 98 %

## 2024-04-08 DIAGNOSIS — E78.00 PURE HYPERCHOLESTEROLEMIA, UNSPECIFIED: ICD-10-CM

## 2024-04-08 DIAGNOSIS — Z00.00 ENCOUNTER FOR GENERAL ADULT MEDICAL EXAMINATION W/OUT ABNORMAL FINDINGS: ICD-10-CM

## 2024-04-08 DIAGNOSIS — Z86.711 PERSONAL HISTORY OF PULMONARY EMBOLISM: ICD-10-CM

## 2024-04-08 PROCEDURE — 93000 ELECTROCARDIOGRAM COMPLETE: CPT

## 2024-04-08 PROCEDURE — 99386 PREV VISIT NEW AGE 40-64: CPT | Mod: 25

## 2024-04-08 RX ORDER — PAROXETINE HYDROCHLORIDE 20 MG/1
20 TABLET, FILM COATED ORAL
Qty: 15 | Refills: 5 | Status: DISCONTINUED | COMMUNITY
Start: 2018-11-26 | End: 2024-04-08

## 2024-04-08 NOTE — PHYSICAL EXAM
[No Acute Distress] : no acute distress [Well Nourished] : well nourished [Well Developed] : well developed [Well-Appearing] : well-appearing [Normal Voice/Communication] : normal voice/communication [Normal Sclera/Conjunctiva] : normal sclera/conjunctiva [PERRL] : pupils equal round and reactive to light [EOMI] : extraocular movements intact [Normal Outer Ear/Nose] : the outer ears and nose were normal in appearance [Normal Oropharynx] : the oropharynx was normal [Normal TMs] : both tympanic membranes were normal [No JVD] : no jugular venous distention [No Lymphadenopathy] : no lymphadenopathy [Supple] : supple [Thyroid Normal, No Nodules] : the thyroid was normal and there were no nodules present [No Respiratory Distress] : no respiratory distress  [No Accessory Muscle Use] : no accessory muscle use [Clear to Auscultation] : lungs were clear to auscultation bilaterally [Normal Rate] : normal rate  [Regular Rhythm] : with a regular rhythm [Normal S1, S2] : normal S1 and S2 [No Murmur] : no murmur heard [No Carotid Bruits] : no carotid bruits [No Abdominal Bruit] : a ~M bruit was not heard ~T in the abdomen [No Varicosities] : no varicosities [Pedal Pulses Present] : the pedal pulses are present [No Edema] : there was no peripheral edema [No Palpable Aorta] : no palpable aorta [No Extremity Clubbing/Cyanosis] : no extremity clubbing/cyanosis [Soft] : abdomen soft [Non Tender] : non-tender [Non-distended] : non-distended [No HSM] : no HSM [Normal Bowel Sounds] : normal bowel sounds [Normal Supraclavicular Nodes] : no supraclavicular lymphadenopathy [Normal Posterior Cervical Nodes] : no posterior cervical lymphadenopathy [Normal Anterior Cervical Nodes] : no anterior cervical lymphadenopathy [No CVA Tenderness] : no CVA  tenderness [No Spinal Tenderness] : no spinal tenderness [No Joint Swelling] : no joint swelling [Grossly Normal Strength/Tone] : grossly normal strength/tone [No Rash] : no rash [Coordination Grossly Intact] : coordination grossly intact [No Focal Deficits] : no focal deficits [Normal Gait] : normal gait [Deep Tendon Reflexes (DTR)] : deep tendon reflexes were 2+ and symmetric [Speech Grossly Normal] : speech grossly normal [Memory Grossly Normal] : memory grossly normal [Normal Affect] : the affect was normal [Alert and Oriented x3] : oriented to person, place, and time [Normal Mood] : the mood was normal [Normal Insight/Judgement] : insight and judgment were intact [Normal Appearance] : normal in appearance [No Masses] : no palpable masses [No Hernias] : no hernias [Normal Sphincter Tone] : normal sphincter tone [No Mass] : no mass [Penis Abnormality] : normal circumcised penis [Scrotum] : the scrotum was normal [Testes Tenderness] : no tenderness of the testes [Testes Mass (___cm)] : there were no testicular masses [Prostate Enlargement] : the prostate was not enlarged [Prostate Tenderness] : the prostate was not tender [No Prostate Nodules] : no prostate nodules [Normal Axillary Nodes] : no axillary lymphadenopathy [Normal Inguinal Nodes] : no inguinal lymphadenopathy [Normal Femoral Nodes] : no femoral lymphadenopathy

## 2024-04-08 NOTE — HISTORY OF PRESENT ILLNESS
[FreeTextEntry1] : annual physical  [de-identified] : ISAMAR DELAROSA is a 43 year old M who presents today for his annual physical. Pt is re-establishing care. Pt has never had a colonoscopy. Pt has no new complaints.

## 2024-04-08 NOTE — HEALTH RISK ASSESSMENT
[Good] : ~his/her~  mood as  good [Yes] : Yes [Monthly or less (1 pt)] : Monthly or less (1 point) [1 or 2 (0 pts)] : 1 or 2 (0 points) [Never (0 pts)] : Never (0 points) [No] : In the past 12 months have you used drugs other than those required for medical reasons? No [No falls in past year] : Patient reported no falls in the past year [Little interest or pleasure doing things] : 1) Little interest or pleasure doing things [Feeling down, depressed, or hopeless] : 2) Feeling down, depressed, or hopeless [0] : 2) Feeling down, depressed, or hopeless: Not at all (0) [PHQ-2 Negative - No further assessment needed] : PHQ-2 Negative - No further assessment needed [Never] : Never [[id number]] : 0

## 2024-04-08 NOTE — END OF VISIT
[FreeTextEntry3] : "I, Guilherme Banuelos, personally scribed the services dictated to me by Dr. Amador Osman MD in this documentation on 04/08/2024"   "I Dr. Amador Osman MD, personally performed the services described in this documentation on 04/08/2024 for the patient as scribed by Guilherme Banuelos in my presence. I have reviewed and verified that all the information is accurate and true."

## 2024-04-12 LAB
APPEARANCE: ABNORMAL
BACTERIA: NEGATIVE /HPF
BILIRUBIN URINE: NEGATIVE
BLOOD URINE: NEGATIVE
CAST: 0 /LPF
COLOR: NORMAL
EPITHELIAL CELLS: 0 /HPF
ESTIMATED AVERAGE GLUCOSE: 108 MG/DL
GLUCOSE QUALITATIVE U: NEGATIVE MG/DL
HBA1C MFR BLD HPLC: 5.4 %
HEMOCCULT STL QL IA: NEGATIVE
KETONES URINE: ABNORMAL MG/DL
LEUKOCYTE ESTERASE URINE: NEGATIVE
MICROSCOPIC-UA: NORMAL
NITRITE URINE: NEGATIVE
PH URINE: 7.5
PROTEIN URINE: NEGATIVE MG/DL
PSA SERPL-MCNC: 0.69 NG/ML
RED BLOOD CELLS URINE: 4 /HPF
SPECIFIC GRAVITY URINE: 1.02
UROBILINOGEN URINE: 0.2 MG/DL
WHITE BLOOD CELLS URINE: 1 /HPF

## 2024-04-14 DIAGNOSIS — E78.00 PURE HYPERCHOLESTEROLEMIA, UNSPECIFIED: ICD-10-CM

## 2024-04-14 DIAGNOSIS — N28.9 DISORDER OF KIDNEY AND URETER, UNSPECIFIED: ICD-10-CM

## 2024-04-14 LAB
ALBUMIN SERPL ELPH-MCNC: 4.5 G/DL
ALP BLD-CCNC: 105 U/L
ALT SERPL-CCNC: 25 U/L
ANION GAP SERPL CALC-SCNC: 19 MMOL/L
AST SERPL-CCNC: 25 U/L
BILIRUB SERPL-MCNC: <0.2 MG/DL
BUN SERPL-MCNC: 17 MG/DL
CALCIUM SERPL-MCNC: 9.4 MG/DL
CHLORIDE SERPL-SCNC: 99 MMOL/L
CHOLEST SERPL-MCNC: 236 MG/DL
CK SERPL-CCNC: 124 U/L
CO2 SERPL-SCNC: 22 MMOL/L
CREAT SERPL-MCNC: 1.47 MG/DL
EGFR: 60 ML/MIN/1.73M2
GLUCOSE SERPL-MCNC: 89 MG/DL
HDLC SERPL-MCNC: 43 MG/DL
LDLC SERPL CALC-MCNC: 111 MG/DL
NONHDLC SERPL-MCNC: 192 MG/DL
POTASSIUM SERPL-SCNC: 4.3 MMOL/L
PROT SERPL-MCNC: 7.3 G/DL
SODIUM SERPL-SCNC: 140 MMOL/L
TRIGL SERPL-MCNC: 472 MG/DL

## 2024-06-16 LAB
ANION GAP SERPL CALC-SCNC: 11 MMOL/L
BUN SERPL-MCNC: 16 MG/DL
CALCIUM SERPL-MCNC: 9 MG/DL
CHLORIDE SERPL-SCNC: 100 MMOL/L
CHOLEST SERPL-MCNC: 231 MG/DL
CO2 SERPL-SCNC: 26 MMOL/L
CREAT SERPL-MCNC: 1.12 MG/DL
EGFR: 83 ML/MIN/1.73M2
GLUCOSE SERPL-MCNC: 89 MG/DL
HDLC SERPL-MCNC: 47 MG/DL
LDLC SERPL CALC-MCNC: 138 MG/DL
NONHDLC SERPL-MCNC: 183 MG/DL
POTASSIUM SERPL-SCNC: 4.2 MMOL/L
SODIUM SERPL-SCNC: 137 MMOL/L
TRIGL SERPL-MCNC: 252 MG/DL